# Patient Record
Sex: FEMALE | Race: WHITE | NOT HISPANIC OR LATINO | URBAN - METROPOLITAN AREA
[De-identification: names, ages, dates, MRNs, and addresses within clinical notes are randomized per-mention and may not be internally consistent; named-entity substitution may affect disease eponyms.]

---

## 2018-05-28 ENCOUNTER — EMERGENCY (EMERGENCY)
Facility: HOSPITAL | Age: 68
LOS: 1 days | Discharge: HOME | End: 2018-05-28
Attending: EMERGENCY MEDICINE | Admitting: EMERGENCY MEDICINE
Payer: COMMERCIAL

## 2018-05-28 VITALS
RESPIRATION RATE: 18 BRPM | OXYGEN SATURATION: 100 % | TEMPERATURE: 98 F | HEART RATE: 70 BPM | SYSTOLIC BLOOD PRESSURE: 108 MMHG | DIASTOLIC BLOOD PRESSURE: 80 MMHG

## 2018-05-28 VITALS
HEART RATE: 88 BPM | TEMPERATURE: 99 F | RESPIRATION RATE: 17 BRPM | SYSTOLIC BLOOD PRESSURE: 142 MMHG | OXYGEN SATURATION: 98 % | DIASTOLIC BLOOD PRESSURE: 67 MMHG

## 2018-05-28 DIAGNOSIS — Z41.1 ENCOUNTER FOR COSMETIC SURGERY: Chronic | ICD-10-CM

## 2018-05-28 DIAGNOSIS — Z79.899 OTHER LONG TERM (CURRENT) DRUG THERAPY: ICD-10-CM

## 2018-05-28 DIAGNOSIS — Z98.89 OTHER SPECIFIED POSTPROCEDURAL STATES: Chronic | ICD-10-CM

## 2018-05-28 DIAGNOSIS — K52.9 NONINFECTIVE GASTROENTERITIS AND COLITIS, UNSPECIFIED: ICD-10-CM

## 2018-05-28 DIAGNOSIS — R10.84 GENERALIZED ABDOMINAL PAIN: ICD-10-CM

## 2018-05-28 DIAGNOSIS — Z79.82 LONG TERM (CURRENT) USE OF ASPIRIN: ICD-10-CM

## 2018-05-28 DIAGNOSIS — E78.5 HYPERLIPIDEMIA, UNSPECIFIED: ICD-10-CM

## 2018-05-28 LAB
ALBUMIN SERPL ELPH-MCNC: 4.1 G/DL — SIGNIFICANT CHANGE UP (ref 3.4–5)
ALP SERPL-CCNC: 96 U/L — SIGNIFICANT CHANGE UP (ref 40–120)
ALT FLD-CCNC: 32 U/L — SIGNIFICANT CHANGE UP (ref 12–42)
ANION GAP SERPL CALC-SCNC: 9 MMOL/L — SIGNIFICANT CHANGE UP (ref 9–16)
APPEARANCE UR: CLEAR — SIGNIFICANT CHANGE UP
AST SERPL-CCNC: 25 U/L — SIGNIFICANT CHANGE UP (ref 15–37)
BASOPHILS NFR BLD AUTO: 0.2 % — SIGNIFICANT CHANGE UP (ref 0–2)
BILIRUB SERPL-MCNC: 0.4 MG/DL — SIGNIFICANT CHANGE UP (ref 0.2–1.2)
BILIRUB UR-MCNC: NEGATIVE — SIGNIFICANT CHANGE UP
BUN SERPL-MCNC: 21 MG/DL — SIGNIFICANT CHANGE UP (ref 7–23)
CALCIUM SERPL-MCNC: 9.9 MG/DL — SIGNIFICANT CHANGE UP (ref 8.5–10.5)
CHLORIDE SERPL-SCNC: 104 MMOL/L — SIGNIFICANT CHANGE UP (ref 96–108)
CO2 SERPL-SCNC: 27 MMOL/L — SIGNIFICANT CHANGE UP (ref 22–31)
COLOR SPEC: YELLOW — SIGNIFICANT CHANGE UP
CREAT SERPL-MCNC: 1.01 MG/DL — SIGNIFICANT CHANGE UP (ref 0.5–1.3)
DIFF PNL FLD: (no result)
EOSINOPHIL NFR BLD AUTO: 0 % — SIGNIFICANT CHANGE UP (ref 0–6)
GLUCOSE SERPL-MCNC: 121 MG/DL — HIGH (ref 70–99)
GLUCOSE UR QL: NEGATIVE — SIGNIFICANT CHANGE UP
HCT VFR BLD CALC: 44.3 % — SIGNIFICANT CHANGE UP (ref 34.5–45)
HGB BLD-MCNC: 15.3 G/DL — SIGNIFICANT CHANGE UP (ref 11.5–15.5)
IMM GRANULOCYTES NFR BLD AUTO: 0.3 % — SIGNIFICANT CHANGE UP (ref 0–1.5)
KETONES UR-MCNC: NEGATIVE — SIGNIFICANT CHANGE UP
LEUKOCYTE ESTERASE UR-ACNC: NEGATIVE — SIGNIFICANT CHANGE UP
LYMPHOCYTES # BLD AUTO: 3.8 % — LOW (ref 13–44)
MAGNESIUM SERPL-MCNC: 1.9 MG/DL — SIGNIFICANT CHANGE UP (ref 1.6–2.6)
MCHC RBC-ENTMCNC: 31.9 PG — SIGNIFICANT CHANGE UP (ref 27–34)
MCHC RBC-ENTMCNC: 34.5 G/DL — SIGNIFICANT CHANGE UP (ref 32–36)
MCV RBC AUTO: 92.3 FL — SIGNIFICANT CHANGE UP (ref 80–100)
MONOCYTES NFR BLD AUTO: 2.5 % — SIGNIFICANT CHANGE UP (ref 2–14)
NEUTROPHILS NFR BLD AUTO: 93.2 % — HIGH (ref 43–77)
NITRITE UR-MCNC: NEGATIVE — SIGNIFICANT CHANGE UP
PH UR: 6 — SIGNIFICANT CHANGE UP (ref 5–8)
PLATELET # BLD AUTO: 219 K/UL — SIGNIFICANT CHANGE UP (ref 150–400)
POTASSIUM SERPL-MCNC: 4.1 MMOL/L — SIGNIFICANT CHANGE UP (ref 3.5–5.3)
POTASSIUM SERPL-SCNC: 4.1 MMOL/L — SIGNIFICANT CHANGE UP (ref 3.5–5.3)
PROT SERPL-MCNC: 7.6 G/DL — SIGNIFICANT CHANGE UP (ref 6.4–8.2)
PROT UR-MCNC: NEGATIVE MG/DL — SIGNIFICANT CHANGE UP
RBC # BLD: 4.8 M/UL — SIGNIFICANT CHANGE UP (ref 3.8–5.2)
RBC # FLD: 11.9 % — SIGNIFICANT CHANGE UP (ref 10.3–16.9)
SODIUM SERPL-SCNC: 140 MMOL/L — SIGNIFICANT CHANGE UP (ref 132–145)
SP GR SPEC: 1.01 — SIGNIFICANT CHANGE UP (ref 1–1.03)
UROBILINOGEN FLD QL: 0.2 E.U./DL — SIGNIFICANT CHANGE UP
WBC # BLD: 12.4 K/UL — HIGH (ref 3.8–10.5)
WBC # FLD AUTO: 12.4 K/UL — HIGH (ref 3.8–10.5)

## 2018-05-28 PROCEDURE — 99284 EMERGENCY DEPT VISIT MOD MDM: CPT

## 2018-05-28 PROCEDURE — 74177 CT ABD & PELVIS W/CONTRAST: CPT | Mod: 26

## 2018-05-28 RX ORDER — FAMOTIDINE 10 MG/ML
20 INJECTION INTRAVENOUS ONCE
Qty: 0 | Refills: 0 | Status: COMPLETED | OUTPATIENT
Start: 2018-05-28 | End: 2018-05-28

## 2018-05-28 RX ORDER — CIPROFLOXACIN LACTATE 400MG/40ML
500 VIAL (ML) INTRAVENOUS ONCE
Qty: 0 | Refills: 0 | Status: COMPLETED | OUTPATIENT
Start: 2018-05-28 | End: 2018-05-28

## 2018-05-28 RX ORDER — SODIUM CHLORIDE 9 MG/ML
1000 INJECTION INTRAMUSCULAR; INTRAVENOUS; SUBCUTANEOUS ONCE
Qty: 0 | Refills: 0 | Status: COMPLETED | OUTPATIENT
Start: 2018-05-28 | End: 2018-05-28

## 2018-05-28 RX ORDER — ONDANSETRON 8 MG/1
4 TABLET, FILM COATED ORAL ONCE
Qty: 0 | Refills: 0 | Status: COMPLETED | OUTPATIENT
Start: 2018-05-28 | End: 2018-05-28

## 2018-05-28 RX ORDER — METRONIDAZOLE 500 MG
1 TABLET ORAL
Qty: 20 | Refills: 0 | OUTPATIENT
Start: 2018-05-28 | End: 2018-06-06

## 2018-05-28 RX ORDER — MOXIFLOXACIN HYDROCHLORIDE TABLETS, 400 MG 400 MG/1
1 TABLET, FILM COATED ORAL
Qty: 20 | Refills: 0 | OUTPATIENT
Start: 2018-05-28 | End: 2018-06-06

## 2018-05-28 RX ORDER — SODIUM CHLORIDE 9 MG/ML
1000 INJECTION INTRAMUSCULAR; INTRAVENOUS; SUBCUTANEOUS ONCE
Qty: 0 | Refills: 0 | Status: DISCONTINUED | OUTPATIENT
Start: 2018-05-28 | End: 2018-05-28

## 2018-05-28 RX ORDER — IOHEXOL 300 MG/ML
50 INJECTION, SOLUTION INTRAVENOUS ONCE
Qty: 0 | Refills: 0 | Status: COMPLETED | OUTPATIENT
Start: 2018-05-28 | End: 2018-05-28

## 2018-05-28 RX ORDER — METRONIDAZOLE 500 MG
500 TABLET ORAL ONCE
Qty: 0 | Refills: 0 | Status: COMPLETED | OUTPATIENT
Start: 2018-05-28 | End: 2018-05-28

## 2018-05-28 RX ORDER — KETOROLAC TROMETHAMINE 30 MG/ML
30 SYRINGE (ML) INJECTION ONCE
Qty: 0 | Refills: 0 | Status: DISCONTINUED | OUTPATIENT
Start: 2018-05-28 | End: 2018-05-28

## 2018-05-28 RX ORDER — ACETAMINOPHEN 500 MG
650 TABLET ORAL ONCE
Qty: 0 | Refills: 0 | Status: COMPLETED | OUTPATIENT
Start: 2018-05-28 | End: 2018-05-28

## 2018-05-28 RX ADMIN — Medication 500 MILLIGRAM(S): at 18:52

## 2018-05-28 RX ADMIN — Medication 650 MILLIGRAM(S): at 15:34

## 2018-05-28 RX ADMIN — FAMOTIDINE 100 MILLIGRAM(S): 10 INJECTION INTRAVENOUS at 13:58

## 2018-05-28 RX ADMIN — Medication 30 MILLIGRAM(S): at 14:15

## 2018-05-28 RX ADMIN — IOHEXOL 50 MILLILITER(S): 300 INJECTION, SOLUTION INTRAVENOUS at 14:11

## 2018-05-28 RX ADMIN — Medication 500 MILLIGRAM(S): at 18:51

## 2018-05-28 RX ADMIN — SODIUM CHLORIDE 2000 MILLILITER(S): 9 INJECTION INTRAMUSCULAR; INTRAVENOUS; SUBCUTANEOUS at 13:59

## 2018-05-28 RX ADMIN — ONDANSETRON 4 MILLIGRAM(S): 8 TABLET, FILM COATED ORAL at 13:58

## 2018-05-28 NOTE — ED PROVIDER NOTE - MEDICAL DECISION MAKING DETAILS
nausea, abdominal pain, diarrhea; diverticulitis, vs, colitis; low suspicion for appendicitis.  will obtain labs and ct abdomen.

## 2018-05-28 NOTE — ED ADULT TRIAGE NOTE - CHIEF COMPLAINT QUOTE
patient comes in for abdominal pain which she feels like its due to food poisoning Has a history of diverticulosis and states has a lot of popcorn yesterday.

## 2018-05-28 NOTE — ED PROVIDER NOTE - OBJECTIVE STATEMENT
Pt is 69 yo female with pmhx of IBS, anxiety presents with nausea and abdominal pain with multiple episodes of diarrhea today that began overnight.  Pt reports also hx of diverticulosis.  She states she ate " a lot of popcorn" last night.  Pt denies any chills, fevers.  Pt denies any vomiting, rash, recent travel, melena, or hematochezia.

## 2018-06-09 ENCOUNTER — EMERGENCY (EMERGENCY)
Facility: HOSPITAL | Age: 68
LOS: 1 days | Discharge: HOME | End: 2018-06-09
Attending: EMERGENCY MEDICINE | Admitting: EMERGENCY MEDICINE
Payer: COMMERCIAL

## 2018-06-09 VITALS
SYSTOLIC BLOOD PRESSURE: 137 MMHG | HEART RATE: 91 BPM | WEIGHT: 125 LBS | DIASTOLIC BLOOD PRESSURE: 84 MMHG | RESPIRATION RATE: 20 BRPM | TEMPERATURE: 98 F | OXYGEN SATURATION: 98 %

## 2018-06-09 DIAGNOSIS — Z98.89 OTHER SPECIFIED POSTPROCEDURAL STATES: Chronic | ICD-10-CM

## 2018-06-09 DIAGNOSIS — Z41.1 ENCOUNTER FOR COSMETIC SURGERY: Chronic | ICD-10-CM

## 2018-06-09 PROCEDURE — 99284 EMERGENCY DEPT VISIT MOD MDM: CPT | Mod: 25

## 2018-06-09 PROCEDURE — 93010 ELECTROCARDIOGRAM REPORT: CPT

## 2018-06-09 RX ORDER — SUCRALFATE 1 G
1 TABLET ORAL ONCE
Qty: 0 | Refills: 0 | Status: COMPLETED | OUTPATIENT
Start: 2018-06-09 | End: 2018-06-09

## 2018-06-09 RX ORDER — METOCLOPRAMIDE HCL 10 MG
5 TABLET ORAL ONCE
Qty: 0 | Refills: 0 | Status: COMPLETED | OUTPATIENT
Start: 2018-06-09 | End: 2018-06-09

## 2018-06-09 RX ORDER — FAMOTIDINE 10 MG/ML
20 INJECTION INTRAVENOUS ONCE
Qty: 0 | Refills: 0 | Status: COMPLETED | OUTPATIENT
Start: 2018-06-09 | End: 2018-06-09

## 2018-06-09 RX ORDER — SODIUM CHLORIDE 9 MG/ML
1000 INJECTION INTRAMUSCULAR; INTRAVENOUS; SUBCUTANEOUS ONCE
Qty: 0 | Refills: 0 | Status: COMPLETED | OUTPATIENT
Start: 2018-06-09 | End: 2018-06-09

## 2018-06-09 NOTE — ED PROVIDER NOTE - OBJECTIVE STATEMENT
69 yo female pmhx diverticulosis/colitis/IBS to ED c/o intermittent sharp colicky diffuse abd pain and bloating. Pt states she feels "gassy". denies cp/soa/syncope/palpitations/urinary frequency/dysuria/f/c. No international travel within past 30 days. Denies diarrhea/constipation/melena.

## 2018-06-09 NOTE — ED ADULT TRIAGE NOTE - CHIEF COMPLAINT QUOTE
Pt ambulatory, recently treated for colitis last May 28, complaining of abdominal pain and 'feeling of fullness", with diarrhea today. Also complains of nausea and lightheadedness; no vomiting.

## 2018-06-10 VITALS
OXYGEN SATURATION: 98 % | TEMPERATURE: 98 F | RESPIRATION RATE: 16 BRPM | SYSTOLIC BLOOD PRESSURE: 124 MMHG | DIASTOLIC BLOOD PRESSURE: 74 MMHG | HEART RATE: 85 BPM

## 2018-06-10 LAB
ALBUMIN SERPL ELPH-MCNC: 3.9 G/DL — SIGNIFICANT CHANGE UP (ref 3.4–5)
ALP SERPL-CCNC: 93 U/L — SIGNIFICANT CHANGE UP (ref 40–120)
ALT FLD-CCNC: 30 U/L — SIGNIFICANT CHANGE UP (ref 12–42)
AMYLASE P1 CFR SERPL: 96 U/L — SIGNIFICANT CHANGE UP (ref 25–115)
ANION GAP SERPL CALC-SCNC: 10 MMOL/L — SIGNIFICANT CHANGE UP (ref 9–16)
APPEARANCE UR: CLEAR — SIGNIFICANT CHANGE UP
AST SERPL-CCNC: 29 U/L — SIGNIFICANT CHANGE UP (ref 15–37)
BILIRUB SERPL-MCNC: 0.3 MG/DL — SIGNIFICANT CHANGE UP (ref 0.2–1.2)
BILIRUB UR-MCNC: NEGATIVE — SIGNIFICANT CHANGE UP
BUN SERPL-MCNC: 18 MG/DL — SIGNIFICANT CHANGE UP (ref 7–23)
CALCIUM SERPL-MCNC: 9.7 MG/DL — SIGNIFICANT CHANGE UP (ref 8.5–10.5)
CHLORIDE SERPL-SCNC: 107 MMOL/L — SIGNIFICANT CHANGE UP (ref 96–108)
CO2 SERPL-SCNC: 24 MMOL/L — SIGNIFICANT CHANGE UP (ref 22–31)
COLOR SPEC: YELLOW — SIGNIFICANT CHANGE UP
CREAT SERPL-MCNC: 0.91 MG/DL — SIGNIFICANT CHANGE UP (ref 0.5–1.3)
DIFF PNL FLD: NEGATIVE — SIGNIFICANT CHANGE UP
GLUCOSE SERPL-MCNC: 115 MG/DL — HIGH (ref 70–99)
GLUCOSE UR QL: NEGATIVE — SIGNIFICANT CHANGE UP
HCT VFR BLD CALC: 41.7 % — SIGNIFICANT CHANGE UP (ref 34.5–45)
HGB BLD-MCNC: 14.2 G/DL — SIGNIFICANT CHANGE UP (ref 11.5–15.5)
KETONES UR-MCNC: NEGATIVE — SIGNIFICANT CHANGE UP
LACTATE SERPL-SCNC: 1.7 MMOL/L — SIGNIFICANT CHANGE UP (ref 0.4–2)
LEUKOCYTE ESTERASE UR-ACNC: NEGATIVE — SIGNIFICANT CHANGE UP
LIDOCAIN IGE QN: 246 U/L — SIGNIFICANT CHANGE UP (ref 73–393)
MCHC RBC-ENTMCNC: 31.3 PG — SIGNIFICANT CHANGE UP (ref 27–34)
MCHC RBC-ENTMCNC: 34.1 G/DL — SIGNIFICANT CHANGE UP (ref 32–36)
MCV RBC AUTO: 92.1 FL — SIGNIFICANT CHANGE UP (ref 80–100)
NITRITE UR-MCNC: NEGATIVE — SIGNIFICANT CHANGE UP
PH UR: 6 — SIGNIFICANT CHANGE UP (ref 5–8)
PLATELET # BLD AUTO: 212 K/UL — SIGNIFICANT CHANGE UP (ref 150–400)
POTASSIUM SERPL-MCNC: 4.2 MMOL/L — SIGNIFICANT CHANGE UP (ref 3.5–5.3)
POTASSIUM SERPL-SCNC: 4.2 MMOL/L — SIGNIFICANT CHANGE UP (ref 3.5–5.3)
PROT SERPL-MCNC: 7.1 G/DL — SIGNIFICANT CHANGE UP (ref 6.4–8.2)
PROT UR-MCNC: NEGATIVE MG/DL — SIGNIFICANT CHANGE UP
RBC # BLD: 4.53 M/UL — SIGNIFICANT CHANGE UP (ref 3.8–5.2)
RBC # FLD: 12 % — SIGNIFICANT CHANGE UP (ref 10.3–16.9)
SODIUM SERPL-SCNC: 141 MMOL/L — SIGNIFICANT CHANGE UP (ref 132–145)
SP GR SPEC: 1.01 — SIGNIFICANT CHANGE UP (ref 1–1.03)
TROPONIN I SERPL-MCNC: <0.017 NG/ML — LOW (ref 0.02–0.06)
TSH SERPL-MCNC: 1.2 UIU/ML — SIGNIFICANT CHANGE UP (ref 0.36–3.74)
UROBILINOGEN FLD QL: 0.2 E.U./DL — SIGNIFICANT CHANGE UP
WBC # BLD: 12.2 K/UL — HIGH (ref 3.8–10.5)
WBC # FLD AUTO: 12.2 K/UL — HIGH (ref 3.8–10.5)

## 2018-06-10 PROCEDURE — 74176 CT ABD & PELVIS W/O CONTRAST: CPT | Mod: 26

## 2018-06-10 RX ORDER — SUCRALFATE 1 G
1 TABLET ORAL
Qty: 120 | Refills: 0 | OUTPATIENT
Start: 2018-06-10 | End: 2018-07-09

## 2018-06-10 RX ORDER — LOPERAMIDE HCL/SIMETHICONE 2-125MG
1 TABLET,CHEWABLE ORAL
Qty: 30 | Refills: 0 | OUTPATIENT
Start: 2018-06-10

## 2018-06-10 RX ADMIN — Medication 1 GRAM(S): at 00:08

## 2018-06-10 RX ADMIN — Medication 5 MILLIGRAM(S): at 00:14

## 2018-06-10 RX ADMIN — FAMOTIDINE 20 MILLIGRAM(S): 10 INJECTION INTRAVENOUS at 00:08

## 2018-06-10 RX ADMIN — SODIUM CHLORIDE 1000 MILLILITER(S): 9 INJECTION INTRAMUSCULAR; INTRAVENOUS; SUBCUTANEOUS at 00:13

## 2018-06-13 DIAGNOSIS — K20.9 ESOPHAGITIS, UNSPECIFIED: ICD-10-CM

## 2018-06-13 DIAGNOSIS — Z79.01 LONG TERM (CURRENT) USE OF ANTICOAGULANTS: ICD-10-CM

## 2018-06-13 DIAGNOSIS — R10.84 GENERALIZED ABDOMINAL PAIN: ICD-10-CM

## 2018-06-13 DIAGNOSIS — Z79.899 OTHER LONG TERM (CURRENT) DRUG THERAPY: ICD-10-CM

## 2018-06-13 DIAGNOSIS — E78.5 HYPERLIPIDEMIA, UNSPECIFIED: ICD-10-CM

## 2018-06-13 DIAGNOSIS — Z79.2 LONG TERM (CURRENT) USE OF ANTIBIOTICS: ICD-10-CM

## 2019-10-10 ENCOUNTER — APPOINTMENT (OUTPATIENT)
Dept: OTOLARYNGOLOGY | Facility: CLINIC | Age: 69
End: 2019-10-10
Payer: COMMERCIAL

## 2019-10-10 VITALS
HEIGHT: 62 IN | BODY MASS INDEX: 22.82 KG/M2 | WEIGHT: 124 LBS | HEART RATE: 66 BPM | SYSTOLIC BLOOD PRESSURE: 130 MMHG | DIASTOLIC BLOOD PRESSURE: 70 MMHG

## 2019-10-10 DIAGNOSIS — Z87.09 PERSONAL HISTORY OF OTHER DISEASES OF THE RESPIRATORY SYSTEM: ICD-10-CM

## 2019-10-10 DIAGNOSIS — H92.09 OTALGIA, UNSPECIFIED EAR: ICD-10-CM

## 2019-10-10 PROCEDURE — 99213 OFFICE O/P EST LOW 20 MIN: CPT | Mod: 25

## 2019-10-10 PROCEDURE — 69210 REMOVE IMPACTED EAR WAX UNI: CPT

## 2019-10-10 NOTE — REVIEW OF SYSTEMS
[Seasonal Allergies] : seasonal allergies [Post Nasal Drip] : post nasal drip [Ear Pain] : ear pain [Vertigo] : vertigo [Ear Noises] : ear noises [Nasal Congestion] : nasal congestion [Hoarseness] : hoarseness [Dry Eyes] : dry eyes [Heartburn] : heartburn [Patient Intake Form Reviewed] : Patient intake form was reviewed

## 2019-10-10 NOTE — HISTORY OF PRESENT ILLNESS
[de-identified] : HEATH ARREOLA is a 69 year woman with a history of TMJD. She has had recent right sided intermittent pain and a sound when she open her mouth.

## 2019-10-10 NOTE — ASSESSMENT
[FreeTextEntry1] : HEATH ARREOLA feels better after ear cleaning. She has cervical spasm and will use heat and massage.

## 2019-10-12 ENCOUNTER — TRANSCRIPTION ENCOUNTER (OUTPATIENT)
Age: 69
End: 2019-10-12

## 2020-01-09 ENCOUNTER — APPOINTMENT (OUTPATIENT)
Dept: OTOLARYNGOLOGY | Facility: CLINIC | Age: 70
End: 2020-01-09
Payer: COMMERCIAL

## 2020-01-09 VITALS
SYSTOLIC BLOOD PRESSURE: 119 MMHG | HEIGHT: 62 IN | BODY MASS INDEX: 22.82 KG/M2 | WEIGHT: 124 LBS | DIASTOLIC BLOOD PRESSURE: 63 MMHG | HEART RATE: 86 BPM

## 2020-01-09 DIAGNOSIS — H81.13 BENIGN PAROXYSMAL VERTIGO, BILATERAL: ICD-10-CM

## 2020-01-09 PROCEDURE — 92567 TYMPANOMETRY: CPT

## 2020-01-09 PROCEDURE — 99213 OFFICE O/P EST LOW 20 MIN: CPT | Mod: 25

## 2020-01-09 PROCEDURE — 31231 NASAL ENDOSCOPY DX: CPT

## 2020-01-09 PROCEDURE — 92557 COMPREHENSIVE HEARING TEST: CPT

## 2020-01-09 RX ORDER — AZELASTINE HYDROCHLORIDE AND FLUTICASONE PROPIONATE 137; 50 UG/1; UG/1
137-50 SPRAY, METERED NASAL
Qty: 1 | Refills: 3 | Status: DISCONTINUED | COMMUNITY
Start: 2019-10-10 | End: 2020-01-09

## 2020-01-09 RX ORDER — AZELASTINE HYDROCHLORIDE AND FLUTICASONE PROPIONATE 137; 50 UG/1; UG/1
137-50 SPRAY, METERED NASAL
Qty: 3 | Refills: 5 | Status: ACTIVE | COMMUNITY
Start: 2020-01-09 | End: 1900-01-01

## 2020-01-09 NOTE — ASSESSMENT
[FreeTextEntry1] : HEATH ARREOLA appears to have resolving BPPV. She will do the vestibular exercises. It is possible her dizziness has to do with her neck. She will use Dymista.

## 2020-01-09 NOTE — CONSULT LETTER
[Dear  ___] : Dear  [unfilled], [Consult Letter:] : I had the pleasure of evaluating your patient, [unfilled]. [Please see my note below.] : Please see my note below. [Sincerely,] : Sincerely, [FreeTextEntry3] : Gilberto Renee MD\par

## 2020-01-09 NOTE — REVIEW OF SYSTEMS
[Seasonal Allergies] : seasonal allergies [Dizziness] : dizziness [Vertigo] : vertigo [Nasal Congestion] : nasal congestion [Throat Clearing] : throat clearing [Patient Intake Form Reviewed] : Patient intake form was reviewed

## 2020-01-09 NOTE — HISTORY OF PRESENT ILLNESS
[de-identified] : HEATH ARREOLA is a 69 year woman with a history of about 3 weeks of intermittent positional dizziness. She has been having some car sickness. She recently used steroids for the dizziness. She is getting PT for a problem with her neck.\par She also is having nasal congestion.

## 2020-09-22 ENCOUNTER — APPOINTMENT (OUTPATIENT)
Dept: OTOLARYNGOLOGY | Facility: CLINIC | Age: 70
End: 2020-09-22
Payer: COMMERCIAL

## 2020-09-22 VITALS — BODY MASS INDEX: 22.82 KG/M2 | WEIGHT: 124 LBS | HEIGHT: 62 IN | TEMPERATURE: 98.6 F

## 2020-09-22 DIAGNOSIS — R51 HEADACHE: ICD-10-CM

## 2020-09-22 PROCEDURE — 99213 OFFICE O/P EST LOW 20 MIN: CPT | Mod: 25

## 2020-09-22 PROCEDURE — 31575 DIAGNOSTIC LARYNGOSCOPY: CPT

## 2020-09-23 NOTE — HISTORY OF PRESENT ILLNESS
[de-identified] : HEATH ARREOLA is a 70 year woman with a history of cervical spine disease. She has tingling in her hands. She is seeing Dr. Camden Armando.MRI showed probable valleculae retention cyst.

## 2020-09-23 NOTE — CONSULT LETTER
[Consult Letter:] : I had the pleasure of evaluating your patient, [unfilled]. [Please see my note below.] : Please see my note below. [Consult Closing:] : Thank you very much for allowing me to participate in the care of this patient.  If you have any questions, please do not hesitate to contact me. [Sincerely,] : Sincerely, [Dear  ___] : Dear ~AYE, [FreeTextEntry3] : Gilberto Renee MD

## 2021-03-24 ENCOUNTER — NON-APPOINTMENT (OUTPATIENT)
Age: 71
End: 2021-03-24

## 2021-03-26 ENCOUNTER — APPOINTMENT (OUTPATIENT)
Dept: OTOLARYNGOLOGY | Facility: CLINIC | Age: 71
End: 2021-03-26
Payer: COMMERCIAL

## 2021-03-26 VITALS — HEIGHT: 62 IN | BODY MASS INDEX: 22.82 KG/M2 | WEIGHT: 124 LBS | TEMPERATURE: 97.5 F

## 2021-03-26 DIAGNOSIS — H91.13 PRESBYCUSIS, BILATERAL: ICD-10-CM

## 2021-03-26 DIAGNOSIS — H61.23 IMPACTED CERUMEN, BILATERAL: ICD-10-CM

## 2021-03-26 PROCEDURE — 99214 OFFICE O/P EST MOD 30 MIN: CPT | Mod: 25

## 2021-03-26 PROCEDURE — G0268 REMOVAL OF IMPACTED WAX MD: CPT

## 2021-03-26 PROCEDURE — 92557 COMPREHENSIVE HEARING TEST: CPT

## 2021-03-26 PROCEDURE — 99072 ADDL SUPL MATRL&STAF TM PHE: CPT

## 2021-03-26 PROCEDURE — 92567 TYMPANOMETRY: CPT

## 2021-03-26 NOTE — ASSESSMENT
[FreeTextEntry1] : HEATH ARREOLA probably has resolving right BPPV. Cervicogenic dizziness is another possibility. I suggested watchful waiting. I will speak to her in several days. If she isn't better I will refer her for Vestibular therapy.\par She will see neurologist regarding hr neck.

## 2021-03-26 NOTE — REVIEW OF SYSTEMS
[Vertigo] : vertigo [Negative] : Heme/Lymph [Patient Intake Form Reviewed] : Patient intake form was reviewed

## 2021-03-26 NOTE — HISTORY OF PRESENT ILLNESS
[de-identified] : HEATH ARREOLA is here complaining of dizziness. When getting out of bed 4 days ago she became very dizzy and off balance. She found head position worsened it. She denied aural symptoms. Her gait is off. She is feeling a bit better today. She has been having neck pain and numbness. She had problem noted on MRI.

## 2021-03-26 NOTE — CONSULT LETTER
[Dear  ___] : Dear  [unfilled], [Consult Letter:] : I had the pleasure of evaluating your patient, [unfilled]. [Please see my note below.] : Please see my note below. [Consult Closing:] : Thank you very much for allowing me to participate in the care of this patient.  If you have any questions, please do not hesitate to contact me. [FreeTextEntry3] : Gilberto Renee MD\par

## 2021-10-12 ENCOUNTER — APPOINTMENT (OUTPATIENT)
Dept: OTOLARYNGOLOGY | Facility: CLINIC | Age: 71
End: 2021-10-12
Payer: MEDICARE

## 2021-10-12 DIAGNOSIS — H91.93 UNSPECIFIED HEARING LOSS, BILATERAL: ICD-10-CM

## 2021-10-12 PROCEDURE — 31575 DIAGNOSTIC LARYNGOSCOPY: CPT

## 2021-10-12 PROCEDURE — 92567 TYMPANOMETRY: CPT

## 2021-10-12 PROCEDURE — 92557 COMPREHENSIVE HEARING TEST: CPT

## 2021-10-12 PROCEDURE — 99213 OFFICE O/P EST LOW 20 MIN: CPT | Mod: 25

## 2021-10-12 PROCEDURE — 69210 REMOVE IMPACTED EAR WAX UNI: CPT

## 2021-10-12 NOTE — HISTORY OF PRESENT ILLNESS
[de-identified] : HEATH ARREOLA is a 71 year woman with a history of 2 weeks of right ear pain and throat. The ear pain is sharp and severe. She is having increased  tinnitus AD which has gone back to previous levels.

## 2021-10-12 NOTE — ASSESSMENT
[FreeTextEntry1] : HEATH ARREOLA appears to be having neck and TMJ pain.  Her audiogram today is unchanged. I think her transient increase in tinnitus is due to her TMJ dysfunction and muscular spasm.I will speak with Dr. Ennis. I think she would benefit from Physical therapy.

## 2021-10-12 NOTE — REVIEW OF SYSTEMS
[Ear Pain] : ear pain [Negative] : Heme/Lymph [Patient Intake Form Reviewed] : Patient intake form was reviewed [de-identified] : right ear pain

## 2021-10-13 PROBLEM — H91.93 HIGH FREQUENCY HEARING LOSS OF BOTH EARS: Status: ACTIVE | Noted: 2020-02-24

## 2021-10-25 ENCOUNTER — APPOINTMENT (OUTPATIENT)
Dept: PHYSICAL MEDICINE AND REHAB | Facility: CLINIC | Age: 71
End: 2021-10-25
Payer: MEDICARE

## 2021-10-25 VITALS — WEIGHT: 124 LBS | BODY MASS INDEX: 22.82 KG/M2 | HEIGHT: 62 IN | RESPIRATION RATE: 16 BRPM

## 2021-10-25 DIAGNOSIS — M17.0 BILATERAL PRIMARY OSTEOARTHRITIS OF KNEE: ICD-10-CM

## 2021-10-25 DIAGNOSIS — Z86.79 PERSONAL HISTORY OF OTHER DISEASES OF THE CIRCULATORY SYSTEM: ICD-10-CM

## 2021-10-25 DIAGNOSIS — M54.12 RADICULOPATHY, CERVICAL REGION: ICD-10-CM

## 2021-10-25 PROCEDURE — 99204 OFFICE O/P NEW MOD 45 MIN: CPT

## 2021-10-25 NOTE — PHYSICAL EXAM
[FreeTextEntry1] : CERVICAL\par GEN: AAOx3. NAD.\par CERVICAL ROM: Flexion, extension, side-bending, rotation, oblique extension all full/pain free.  \par SHOULDER ROM: Full and pain free B/L.\par PALP: (+) TTP B-Traps/LevScap/Paraspinals. No TTP midline or shoulder region B/L.\par INSP: Spine alignment is midline, No evidence of scoliosis.\par STRENGTH: 5/5 bilateral shoulder abductors, elbow flexors, elbow extensors, wrist extensors, hand intrinsics, long finger flexors to D3 and D5.\par TONE: Normal, No clonus.\par REFLEXES: Symmetric biceps, triceps, brachioradialis, pronator teres. Trejo's (-) B/L.\par SENSATION: Grossly intact LT BUE.\par GAIT: Non antalgic, Normal reciprocating heel to toe.\par SPECIAL: Spurling's (-) B/L. Axial Compression (-).

## 2021-10-25 NOTE — HISTORY OF PRESENT ILLNESS
[FreeTextEntry1] : Referring Physician: Dr. Gilberto Renee MD\par \par Ms. HEATH ARREOLA is a very pleasant 71 year RHD female who comes in for evaluation of Neck Pain that has been ongoing for years without any specific injury or inciting event. Patient has tried Chiropractor, Injection in her Neck approximately 1-2 years ago, Tylenol, PT which helped temporarily. The pain is located primarily on her Neck radiating to BILATERAL ARMS intermittent and described as tightness and sharp with numbness/tingling. The pain is rated as 4/10 and ranges from 4-7/10. The patient's symptoms are aggravated by sitting, playing Guitar and alleviated by PT. 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 The numbness sometimes lasts for an hour and then resolves. She denies any night pain, weakness, or bowel/bladder dysfunction.\par

## 2021-10-25 NOTE — DATA REVIEWED
[MRI] : MRI [FreeTextEntry1] : MRI CSPINE 09/2020: C5-6 disc bulge with bilateral foraminal narrowing.

## 2021-10-25 NOTE — ASSESSMENT
[FreeTextEntry1] : Impression:\par 1. Cervical Radiculopathy vs Spinal Stenosis\par 2. Cervicalgia/Myofascial pain\par 3. Bilateral Knee DJD\par \par Plan: The history and physical examination were reviewed. Symptoms are potentially CSpine in etiology however the history/physical are atypical. The patient have a predominant muscular/myofascial component. The diagnosis was discussed with the patient along with treatment options including physical therapy, oral medication, interventional spine procedures, and surgery; as well as alternative therapeutics such as acupuncture and massage. We also discussed the importance of activity modification, ergonomics and posture in the long term management of the condition. At this time I am recommending that the patient undergo an MRI of the CSpine to further evaluate for disc pathology and nerve root involvement in preparation for MICHEL. We will also start a course of acupuncture for her neck. She also has DJD of the knees that has not been managed in several years, we will obtain new XR and initial care. The patient was educated on red flag symptoms and was instructed to call the office should the current condition worsen or new symptoms develop. The patient will follow up for imaging review. The patient expressed verbal understanding and is in agreement with the plan of care. All of the patient's questions and concerns were addressed during today's visit.\par

## 2021-11-05 ENCOUNTER — APPOINTMENT (OUTPATIENT)
Dept: OTOLARYNGOLOGY | Facility: CLINIC | Age: 71
End: 2021-11-05
Payer: MEDICARE

## 2021-11-05 ENCOUNTER — APPOINTMENT (OUTPATIENT)
Dept: PHYSICAL MEDICINE AND REHAB | Facility: CLINIC | Age: 71
End: 2021-11-05
Payer: MEDICARE

## 2021-11-05 VITALS — BODY MASS INDEX: 22.82 KG/M2 | WEIGHT: 124 LBS | HEIGHT: 62 IN | RESPIRATION RATE: 16 BRPM

## 2021-11-05 VITALS — WEIGHT: 124 LBS | TEMPERATURE: 97 F | HEIGHT: 62 IN | BODY MASS INDEX: 22.82 KG/M2

## 2021-11-05 DIAGNOSIS — G44.209 TENSION-TYPE HEADACHE, UNSPECIFIED, NOT INTRACTABLE: ICD-10-CM

## 2021-11-05 DIAGNOSIS — H92.01 OTALGIA, RIGHT EAR: ICD-10-CM

## 2021-11-05 DIAGNOSIS — H93.11 TINNITUS, RIGHT EAR: ICD-10-CM

## 2021-11-05 PROCEDURE — 99213 OFFICE O/P EST LOW 20 MIN: CPT

## 2021-11-05 PROCEDURE — 99214 OFFICE O/P EST MOD 30 MIN: CPT

## 2021-11-05 NOTE — DATA REVIEWED
[Plain X-Rays] : plain X-Rays [MRI] : MRI [FreeTextEntry1] : MRI CSPINE 10/2021: C5-6 disc-osteophyte complex with bilateral foraminal narrowing.\par XR KNEES 10/2021: Bilateral patella ian. Preserved joint spaces.

## 2021-11-05 NOTE — ASSESSMENT
[FreeTextEntry1] : Impression:\par 1. Cervicalgia/Myofascial pain\par 2. Bilateral Patella Staci\par \par Plan: The history and physical examination were reviewed along with new imaging. Symptoms do not appear to be CSpine in etiology based on imaging findings correlated with exam findings. The patient does have a predominant muscular/myofascial component of her neck pain. The imaging and diagnosis were discussed with the patient along with treatment options including physical therapy, oral medication, interventional spine procedures, and surgery; as well as alternative therapeutics such as acupuncture and massage. I provided a referral to participate in PT, along with acupuncture/massage for her neck. She will see Neurology for further evaluation/treatment of her BUE numbness/tingling. The patient will follow up in 2-3 months. The patient expressed verbal understanding and is in agreement with the plan of care. All of the patient's questions and concerns were addressed during today's visit.

## 2021-11-05 NOTE — HISTORY OF PRESENT ILLNESS
[FreeTextEntry1] : Ms. HEATH ARREOLA is a very pleasant 71 year RHD female who comes in for MRI review and follow up of Neck Pain that has been ongoing for years without any specific injury or inciting event. Patient has tried Chiropractor, Acupuncture, and TPI which helped temporarily. The pain is located primarily on her Neck radiating to BILATERAL ARMS intermittent and described as tightness and sharp with numbness/tingling. The pain is rated as 4/10 and ranges from 4-7/10. The patient's symptoms are aggravated by sitting, playing Guitar and alleviated by PT. 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 The numbness sometimes lasts for an hour and then resolves. She denies any night pain, weakness, or bowel/bladder dysfunction.\par

## 2021-11-08 ENCOUNTER — APPOINTMENT (OUTPATIENT)
Dept: RADIOLOGY | Facility: CLINIC | Age: 71
End: 2021-11-08

## 2021-11-08 ENCOUNTER — APPOINTMENT (OUTPATIENT)
Dept: MRI IMAGING | Facility: CLINIC | Age: 71
End: 2021-11-08

## 2021-11-11 ENCOUNTER — NON-APPOINTMENT (OUTPATIENT)
Age: 71
End: 2021-11-11

## 2021-11-12 ENCOUNTER — APPOINTMENT (OUTPATIENT)
Dept: OTOLARYNGOLOGY | Facility: CLINIC | Age: 71
End: 2021-11-12
Payer: MEDICARE

## 2021-11-12 DIAGNOSIS — H90.3 SENSORINEURAL HEARING LOSS, BILATERAL: ICD-10-CM

## 2021-11-12 DIAGNOSIS — M26.609 UNSPECIFIED TEMPOROMANDIBULAR JOINT DISORDER: ICD-10-CM

## 2021-11-12 DIAGNOSIS — H93.13 TINNITUS, BILATERAL: ICD-10-CM

## 2021-11-12 DIAGNOSIS — J00 ACUTE NASOPHARYNGITIS [COMMON COLD]: ICD-10-CM

## 2021-11-12 PROCEDURE — 99214 OFFICE O/P EST MOD 30 MIN: CPT

## 2021-11-12 NOTE — CONSULT LETTER
[Dear  ___] : Dear  [unfilled], [Courtesy Letter:] : I had the pleasure of seeing your patient, [unfilled], in my office today. [Please see my note below.] : Please see my note below. [Consult Closing:] : Thank you very much for allowing me to participate in the care of this patient.  If you have any questions, please do not hesitate to contact me. [Sincerely,] : Sincerely, [FreeTextEntry3] : Ashanti Velasquez MD\par

## 2021-11-12 NOTE — ASSESSMENT
[FreeTextEntry1] : She has a history of bilateral tinnitus which may be worse in the left ear at this time. She also has a history of otalgia and disequilibrium. She has cervical disc disease and TMJ dysfunction which are likely causing the exacerbation of the tinnitus. Her ears were normal on exam. Her recent audiogram was reviewed.\par \par PLAN\par \par -findings and management options discussed in detail with the patient. \par -good aural hygiene\par -Avoid using cotton swabs in the ears\par -Annual audiogram\par -I gave her literature regarding tinnitus. She could consider tinnitus therapy. \par -Dental followup for management of her alignment of the teeth and TMJ dysfunction\par -Continue physical therapy. I also recommend physical therapy for the TMJ D.\par -Neurology evaluation is pending\par -We discussed obtaining an MRI with and without contrast of the brain and IACs. She would like to proceed with this.\par -Continue Dymista for the nasal congestion\par -She may try Breathe Right strips. She was cautioned that they could cause irritation of the skin\par -Followup or call for the MRI results\par -call and return earlier if any concerns. \par

## 2021-11-12 NOTE — HISTORY OF PRESENT ILLNESS
[de-identified] : HEATH ARREOLA is a 71 year patient With a history of neck pain, TMJ dysfunction, chronic rhinitis, bilateral high-pitched sensorineural hearing loss, and tinnitus. I was asked to see her by Dr. Renee for an opinion regarding her tinnitus. She has a history chronic tinnitus. It has been exacerbated over the past 6 weeks. She did not think there was a preceding event. She has mild otalgia and a little bit of dizziness. She has no otorrhea. She describes being off balance rather than having vertigo. She does have a problem with her C-spine. She was evaluated for PT. She is also been undergoing some dental adjustment of the alignment of her teeth. She has mild nasal congestion. She uses Dymista. She has no history of recurrent ear infections or prior otologic surgery. Her chart including the CT scan and audiogram were reviewed. She did have a CT scan of the C-spine. She is waiting for appointment to see a neurologist\par \par

## 2021-11-15 PROBLEM — H92.01 RIGHT EAR PAIN: Status: ACTIVE | Noted: 2021-10-12

## 2021-11-15 PROBLEM — G44.209 TENSION-TYPE HEADACHE, NOT INTRACTABLE, UNSPECIFIED CHRONICITY PATTERN: Status: ACTIVE | Noted: 2020-09-22

## 2021-11-15 PROBLEM — H93.11 TINNITUS OF RIGHT EAR: Status: ACTIVE | Noted: 2021-10-12

## 2021-11-15 NOTE — REVIEW OF SYSTEMS
[Ear Pain] : ear pain [Vertigo] : vertigo [Ear Noises] : ear noises [Nasal Congestion] : nasal congestion [Negative] : Heme/Lymph [Patient Intake Form Reviewed] : Patient intake form was reviewed [de-identified] : headache

## 2021-11-15 NOTE — HISTORY OF PRESENT ILLNESS
[de-identified] : HEATH ARREOLA is a 71 year woman with a history of Neck and ear pain. She is having increased bilateral tinnitus and nasal/hed congestion.

## 2021-11-15 NOTE — ASSESSMENT
[FreeTextEntry1] : HEATH ARREOLA has continued/increased. congestion and louder tinnitus. I suggested continuing Dymista and speaking in 72 hour

## 2021-11-30 ENCOUNTER — NON-APPOINTMENT (OUTPATIENT)
Age: 71
End: 2021-11-30

## 2021-12-02 ENCOUNTER — APPOINTMENT (OUTPATIENT)
Dept: NEUROLOGY | Facility: CLINIC | Age: 71
End: 2021-12-02
Payer: MEDICARE

## 2021-12-02 VITALS
WEIGHT: 125.25 LBS | RESPIRATION RATE: 16 BRPM | DIASTOLIC BLOOD PRESSURE: 78 MMHG | HEIGHT: 62 IN | HEART RATE: 84 BPM | SYSTOLIC BLOOD PRESSURE: 134 MMHG | TEMPERATURE: 98 F | BODY MASS INDEX: 23.05 KG/M2 | OXYGEN SATURATION: 94 %

## 2021-12-02 PROCEDURE — 99204 OFFICE O/P NEW MOD 45 MIN: CPT

## 2021-12-02 RX ORDER — ATORVASTATIN CALCIUM 20 MG/1
20 TABLET, FILM COATED ORAL DAILY
Refills: 0 | Status: ACTIVE | COMMUNITY

## 2021-12-02 RX ORDER — AZELASTINE HYDROCHLORIDE AND FLUTICASONE PROPIONATE 137; 50 UG/1; UG/1
137-50 SPRAY, METERED NASAL
Refills: 0 | Status: DISCONTINUED | COMMUNITY
End: 2021-12-02

## 2021-12-02 NOTE — HISTORY OF PRESENT ILLNESS
[FreeTextEntry1] : Has had longstanding neck and back issues.  Has been working from home for the past year leaning over the computer and/or piano, has started feeling more of a twinge in the left side of her neck radiating down the left arm. Often wakes up lying on her stomach with arms overhead and numbness running down both arms.  Recently started acupuncture and physical therapy for neck symptoms.  \par \par Over the past year has also noticed ringing in the left > right ear.  No hearing loss.  When she turns her head to the side she feels a little dizzy and off balance, but not spinning.  Has been evaluated by Dr. Renee and Dr. Velasquez from ENT.  \par

## 2021-12-02 NOTE — CONSULT LETTER
[Dear  ___] : Dear  [unfilled], [Consult Letter:] : I had the pleasure of evaluating your patient, [unfilled]. [Please see my note below.] : Please see my note below. [Consult Closing:] : Thank you very much for allowing me to participate in the care of this patient.  If you have any questions, please do not hesitate to contact me. [Sincerely,] : Sincerely, [FreeTextEntry2] : Gilberto Renee MD [FreeTextEntry3] : Tiera Albright MD [___] : [unfilled]

## 2021-12-02 NOTE — REASON FOR VISIT
[Initial Evaluation] : an initial evaluation [FreeTextEntry1] : tinnitus, neck pain, upper extremity numbness

## 2021-12-02 NOTE — ASSESSMENT
[FreeTextEntry1] : 1) Neck pain \par -MRI C spine shows mild degenerative disease\par -Suspect pain is more musculoskeletal\par -Continue physical therapy and acupuncture\par -Reviewed optimal arm position for keyboard use to prevent neck and upper back pain\par -Consider muscle relaxant such as flexeril if symptoms do not improve\par \par 2) Upper extremity numbness\par -UE numbness is not due to radiculopathy as there is no evidence of nerve impingement on MRI, possibly transient radial nerve palsy due to sleeping position\par \par 3) Tinnitus, possible mild BPPV\par -No lesions on MRI\par -Continuous, non-pulsatile, no need for vascular imaging at this time\par -Trial of low salt diet, can consider diuretic if worsening symptoms

## 2021-12-02 NOTE — DATA REVIEWED
[de-identified] : MRI brain independently reviewed, no acoustic neuromas or other lesion to explain tinnitus, mild white matter micrvascular disease\par MRI C spine independently reviewed, no cord signal change or nerve impingement

## 2022-02-24 ENCOUNTER — APPOINTMENT (OUTPATIENT)
Dept: OTOLARYNGOLOGY | Facility: CLINIC | Age: 72
End: 2022-02-24
Payer: MEDICARE

## 2022-02-24 DIAGNOSIS — M54.2 CERVICALGIA: ICD-10-CM

## 2022-02-24 PROCEDURE — 31575 DIAGNOSTIC LARYNGOSCOPY: CPT

## 2022-02-24 PROCEDURE — 99213 OFFICE O/P EST LOW 20 MIN: CPT | Mod: 25

## 2022-02-24 NOTE — ASSESSMENT
[FreeTextEntry1] : HEATH ARREOLA is feeling better. She si having LPR. I suggested and discussed in detail a strict reflux diet and gave the patient a handout.\par I am recommending Pepcid 20mg  before bedtime.\par \par

## 2022-02-24 NOTE — HISTORY OF PRESENT ILLNESS
[de-identified] : HEATH ARREOLA is a 72 year woman with a history of musculoskeletal problems which are improving with PT. She recently started singing lessons. She wants to make sure she doesn’t have polyps. She is having reflux.

## 2022-03-14 ENCOUNTER — APPOINTMENT (OUTPATIENT)
Dept: PHYSICAL MEDICINE AND REHAB | Facility: CLINIC | Age: 72
End: 2022-03-14
Payer: MEDICARE

## 2022-03-14 VITALS — HEIGHT: 62 IN | WEIGHT: 125 LBS | BODY MASS INDEX: 23 KG/M2

## 2022-03-14 DIAGNOSIS — R20.0 ANESTHESIA OF SKIN: ICD-10-CM

## 2022-03-14 DIAGNOSIS — M54.50 LOW BACK PAIN, UNSPECIFIED: ICD-10-CM

## 2022-03-14 PROCEDURE — 99214 OFFICE O/P EST MOD 30 MIN: CPT

## 2022-03-15 PROBLEM — R20.0 BILATERAL LEG NUMBNESS: Status: ACTIVE | Noted: 2022-03-14

## 2022-03-15 NOTE — HISTORY OF PRESENT ILLNESS
[FreeTextEntry1] : Ms. HEATH ARREOLA is a very pleasant 71 year RHD female who comes in for follow up of Neck Pain radiating to BUE. She has been doing PT which initially was providing relief however recently symptoms have again worsened. She continues to experience neck pain with radiating numbness into bilateral arms/hands, in addition she is now feeling numbness going down both legs. Thus far MRI CSPINE and BRAIN have been inconclusive. The patient's symptoms are worse in the morning upon waking, aggravated by sitting, playing Guitar and alleviated by PT. The patient is a Retired Teacher. The numbness sometimes lasts for an hour and then resolves. She denies any night pain, weakness, or bowel/bladder dysfunction.\par

## 2022-03-15 NOTE — ASSESSMENT
[FreeTextEntry1] : Impression:\par 1. Cervicalgia/Myofascial pain\par 2. Idiopathic Neuropathy\par \par \par Plan: The history and physical examination were reviewed along with imaging. Symptoms remain unclear, CSpine imaging does not lead to clear spine etiology, present exam findings do not indicate LSpine etiology for BLE symptoms either. The neck pain continues to have a predominant muscular/myofascial component, however the BUE and BLE paresthesias do not correlate with exam or imaging. We could consider MRI LSPINE to rule out any potential pathology, however this does not seem to be most likely. She will follow up with Neurology for further evaluation/treatment and consideration of EMG/NCV. I have advised her to hold off on PT for now. The patient will follow up as needed. The patient expressed verbal understanding and is in agreement with the plan of care. All of the patient's questions and concerns were addressed during today's visit.

## 2022-03-16 ENCOUNTER — OUTPATIENT (OUTPATIENT)
Dept: OUTPATIENT SERVICES | Facility: HOSPITAL | Age: 72
LOS: 1 days | End: 2022-03-16

## 2022-03-16 ENCOUNTER — RESULT REVIEW (OUTPATIENT)
Age: 72
End: 2022-03-16

## 2022-03-16 ENCOUNTER — APPOINTMENT (OUTPATIENT)
Dept: MRI IMAGING | Facility: CLINIC | Age: 72
End: 2022-03-16
Payer: MEDICARE

## 2022-03-16 DIAGNOSIS — Z41.1 ENCOUNTER FOR COSMETIC SURGERY: Chronic | ICD-10-CM

## 2022-03-16 DIAGNOSIS — Z98.89 OTHER SPECIFIED POSTPROCEDURAL STATES: Chronic | ICD-10-CM

## 2022-03-16 PROCEDURE — G1004: CPT

## 2022-03-16 PROCEDURE — 72148 MRI LUMBAR SPINE W/O DYE: CPT | Mod: 26,ME

## 2022-05-06 ENCOUNTER — NON-APPOINTMENT (OUTPATIENT)
Age: 72
End: 2022-05-06

## 2022-05-09 ENCOUNTER — APPOINTMENT (OUTPATIENT)
Dept: NEUROLOGY | Facility: CLINIC | Age: 72
End: 2022-05-09
Payer: MEDICARE

## 2022-05-09 VITALS
TEMPERATURE: 98 F | DIASTOLIC BLOOD PRESSURE: 85 MMHG | OXYGEN SATURATION: 96 % | HEIGHT: 62 IN | SYSTOLIC BLOOD PRESSURE: 149 MMHG | HEART RATE: 82 BPM | WEIGHT: 125 LBS | BODY MASS INDEX: 23 KG/M2

## 2022-05-09 DIAGNOSIS — R29.898 OTHER SYMPTOMS AND SIGNS INVOLVING THE MUSCULOSKELETAL SYSTEM: ICD-10-CM

## 2022-05-09 PROCEDURE — 99214 OFFICE O/P EST MOD 30 MIN: CPT

## 2022-05-09 RX ORDER — HYDROCORTISONE 2.5% 25 MG/G
2.5 CREAM TOPICAL
Qty: 30 | Refills: 0 | Status: ACTIVE | COMMUNITY
Start: 2022-03-14

## 2022-05-09 RX ORDER — KETOCONAZOLE 20 MG/G
2 CREAM TOPICAL
Qty: 60 | Refills: 0 | Status: ACTIVE | COMMUNITY
Start: 2022-05-04

## 2022-05-09 RX ORDER — HYDROCORTISONE 25 MG/G
2.5 CREAM TOPICAL
Qty: 30 | Refills: 0 | Status: ACTIVE | COMMUNITY
Start: 2022-05-04

## 2022-05-09 NOTE — REASON FOR VISIT
PEDIATRIC ILLNESS VISIT   3/17/2020        Roomed by: Kayleen Villegas MD      SUBJECTIVE   accompanied by mother  Sunshine is a 7 year old female who is complaining of left eye drainage and cough.  Present for a day with eye drainage and congestion for 4 days and is worsening.  Present treatments include - OTC meds for fever.   Previous medical contacts for the problem - none  Symptoms:  Fever: fever to 101 the first couple days  Review of Systems   Constitutional: Positive for activity change and appetite change.   HENT: Positive for congestion.    Eyes: Positive for discharge and itching.   Respiratory: Positive for cough.    Cardiovascular: Negative.    Gastrointestinal: Negative.    Skin: Negative.      Allergies:  ALLERGIES:  No Known Allergies    Current Outpatient Medications   Medication Sig Dispense Refill   • tobramycin (TOBREX) 0.3 % ophthalmic solution Place 2 drops into both eyes 3 times daily. For 5-7 days 5 mL 0   • amoxicillin (AMOXIL) 400 MG/5ML suspension 10 ml by mouth twice per day for 10 days 200 mL 0   • ciprofloxacin (CILOXAN) 0.3 % ophthalmic solution Place 2 drops into both eyes 3 times daily. For 5 -7 days 2.5 mL 1     No current facility-administered medications for this visit.        Family History   Problem Relation Age of Onset   • Patient is unaware of any medical problems Mother    • Patient is unaware of any medical problems Father    • Patient is unaware of any medical problems Maternal Grandmother    • Patient is unaware of any medical problems Maternal Grandfather    • Patient is unaware of any medical problems Paternal Grandmother    • Patient is unaware of any medical problems Paternal Grandfather    • Cancer Neg Hx    • Coronary Artery Disease Neg Hx    • Diabetes Neg Hx      No other family members have acute illness     Social history:   Attends school    OBJECTIVE:   Visit Vitals  Pulse 100   Temp 98.6 °F (37 °C) (Temporal)   Resp 24   Wt 19.1 kg (42 lb)     Physical  Exam  Constitutional:       Appearance: She is well-developed.   HENT:      Right Ear: A middle ear effusion is present.      Left Ear: A middle ear effusion is present.      Nose: Mucosal edema, congestion and rhinorrhea present.      Mouth/Throat:      Mouth: Mucous membranes are moist.      Pharynx: Posterior oropharyngeal erythema present. No oropharyngeal exudate.   Eyes:      General:         Right eye: Discharge present.         Left eye: Discharge present.     Comments: Conjunctivae red x 2   Pulmonary:      Effort: Pulmonary effort is normal.      Breath sounds: Normal breath sounds.   Neurological:      Mental Status: She is alert.         ASSESSMENT/PLAN    Conjunctivitis -  antibiotic eye drops - see orders - side effects of medication discussed, warm, moist compresses frequently  Otitis Media -  prescription oral antibiotics - see orders - side effects of medication discussed, acetaminophen/ibuprofen as needed for pain and fever  Orders Placed This Encounter   • tobramycin (TOBREX) 0.3 % ophthalmic solution   • amoxicillin (AMOXIL) 400 MG/5ML suspension         Medication changes: Yes    Describe: The caretaker was informed of the benefits and side affects and was in agreement to start treatment.  Also it was discussed when to follow up if not improving within a certain time frame or worsening.        Immunizations given today? No  See Orders:  Instructed to call if the problem worsens or does not improve within the next 24 to 48 hours.    Schedule follow-up: sara Villegas MD         [Follow-Up: _____] : a [unfilled] follow-up visit

## 2022-05-09 NOTE — HISTORY OF PRESENT ILLNESS
[FreeTextEntry1] : Continues to have intermittent numbness, mostly in the hands and arms but occasionally in the legs as well.  Often wakes up with numb arms, thinks it may be related to sleeping position.  Thinks that her hand/arm position during laptop use or while playing guitar may contribute as well.  Has not noticed any weakness in arms, hands, or legs.

## 2022-05-09 NOTE — ASSESSMENT
[FreeTextEntry1] : Migrating upper and lower extremity numbness with exacerbation.\par \par MRI C and L spine were unremarkable, ruling out myelopathy or radiculopathy, but she does have bilateral hand weakness on exam which may suggest a median or ulnar neuropathy.\par Will obtain EMG/NCS of upper extremities.\par \par RTC 4 months

## 2022-05-09 NOTE — PHYSICAL EXAM
[FreeTextEntry1] : Physical Exam\par Constitutional: no apparent distress\par Psychiatric: normal affect, euthymic, alert and oriented x 3\par \par Neurologic Exam:\par Mental Status: awake and alert, speech fluent and prosodic with no paraphasic errors\par Cranial Nerves: tracks in all directions, face symmetric, no dysarthria\par Motor: normal bulk and tone, no orbiting or pronator drift, power 5/5 to confrontation throughout including shoulder abduction, elbow flexion and extension, wrist flexion and extension, 4+/5 finger abduction, finger adduction, thumb abduction, 5/5 hip flexion, knee flexion and extension, plantarflexion, dorsiflexion\par Sensation: intact to light touch in distal upper and lower extremities bilaterally; intact in medial, ulnar, and radial distributions bilaterally\par Gait: narrow base, normal stance and stride, normal arm swing, normal tandem, negative Romberg\par

## 2022-05-09 NOTE — DATA REVIEWED
[de-identified] : MRI C spine and L spine independently reviewed and reviewed with patient.  Few small disc herniations noted.  No cord or nerve root compression.

## 2022-05-10 ENCOUNTER — APPOINTMENT (OUTPATIENT)
Dept: OTOLARYNGOLOGY | Facility: CLINIC | Age: 72
End: 2022-05-10
Payer: MEDICARE

## 2022-05-10 VITALS — HEIGHT: 62 IN | TEMPERATURE: 96.6 F | WEIGHT: 127 LBS | BODY MASS INDEX: 23.37 KG/M2

## 2022-05-10 DIAGNOSIS — J00 ACUTE NASOPHARYNGITIS [COMMON COLD]: ICD-10-CM

## 2022-05-10 PROCEDURE — 99213 OFFICE O/P EST LOW 20 MIN: CPT | Mod: 25

## 2022-05-10 PROCEDURE — 31231 NASAL ENDOSCOPY DX: CPT

## 2022-05-10 NOTE — HISTORY OF PRESENT ILLNESS
[de-identified] : HEATH ARREOLA is a 72 year woman with a history of numbness arms and legs. MRI was negative. She is having EMG tomorrow. Her ears are stuffy.

## 2022-05-10 NOTE — REVIEW OF SYSTEMS
[Nasal Congestion] : nasal congestion [de-identified] : tinnitus, pressure [de-identified] : heartburn

## 2022-05-11 ENCOUNTER — APPOINTMENT (OUTPATIENT)
Dept: NEUROLOGY | Facility: CLINIC | Age: 72
End: 2022-05-11
Payer: MEDICARE

## 2022-05-11 VITALS
HEART RATE: 81 BPM | WEIGHT: 127 LBS | SYSTOLIC BLOOD PRESSURE: 126 MMHG | TEMPERATURE: 97.4 F | DIASTOLIC BLOOD PRESSURE: 77 MMHG | HEIGHT: 62 IN | BODY MASS INDEX: 23.37 KG/M2 | OXYGEN SATURATION: 96 %

## 2022-05-11 DIAGNOSIS — R20.0 ANESTHESIA OF SKIN: ICD-10-CM

## 2022-05-11 PROCEDURE — 95912 NRV CNDJ TEST 11-12 STUDIES: CPT

## 2022-05-11 PROCEDURE — 99213 OFFICE O/P EST LOW 20 MIN: CPT | Mod: 25

## 2022-05-11 NOTE — PROCEDURE
[FreeTextEntry1] : \par Nerve Conduction and Electromyography Report\par  [FreeTextEntry3] : \par Electro Physiologic Findings:\par \par Limb temperature was monitored and maintained at approximately 30 – 34° C in the lower extremities, and 32 – 36° C in the upper extremities.\par \par The median and ulnar sensory velocities were mildly reduced bilaterally; there was no focal slowing of the median velocities across the wrists. The left ulnar motor response was mildly slow across the elbow without conduction block; the right ulnar motor and bilateral median motor responses were normal. \par \par The right sural and superficial fibular sensory responses were normal. The right fibular motor response was also normal. The right fibular and bilateral median and ulnar F-wave latencies were normal. \par \par Clinical Electrophysiological Impression: \par \par This electrodiagnostic study was largely unremarkable. The median and ulnar sensory velocities were mildly reduced which may be due to cold extremities. There was a mild left ulnar nerve entrapment at the elbow, however this does not explain the majority of the patient’s symptoms. There was no evidence of polyneuropathy or median nerve entrapments at the wrists.

## 2022-05-11 NOTE — ASSESSMENT
[FreeTextEntry1] : Neurologic exam normal and NCS largely unremarkable - mild slowing of UE sensory responses that may be due to cold limbs, no definite large fiber neuropathy\par There was a mild left ulnar nerve entrapment at the elbow however that does not explain the majority of the patient's symptoms\par Given that she has no pain or functional impairment, will monitor for now, return for new or worsening symptoms\par \par See separate procedure note for full results of NCS/EMG study

## 2022-05-11 NOTE — HISTORY OF PRESENT ILLNESS
[FreeTextEntry1] : Referred by Dr. Albright for numbness in arms, hands, legs and feet\par Arms and hands worse at night - sleeps on her stomach\par Started about a year ago\par She had neck pain but no radiation into the arms; this has improved with PT \par \par Personally reviewed and interpreted:\par MRI C spine - mild disc bulges at C5/6 and C6/7 without nerve root or cord compression

## 2022-05-11 NOTE — PHYSICAL EXAM
[FreeTextEntry1] : Motor: thumb abduction 4/5 b/l, \par Sensory: light touch, pinprick, vibration sense intact and  symmetric throughout\par Reflexes: 2+ symmetric \par Gait: normal, can tandem without difficulty

## 2022-07-19 ENCOUNTER — APPOINTMENT (OUTPATIENT)
Dept: OTOLARYNGOLOGY | Facility: CLINIC | Age: 72
End: 2022-07-19

## 2022-07-19 VITALS — BODY MASS INDEX: 23.23 KG/M2 | WEIGHT: 127 LBS

## 2022-07-19 VITALS — TEMPERATURE: 95.7 F | HEIGHT: 62 IN

## 2022-07-19 DIAGNOSIS — K21.9 GASTRO-ESOPHAGEAL REFLUX DISEASE W/OUT ESOPHAGITIS: ICD-10-CM

## 2022-07-19 DIAGNOSIS — R42 DIZZINESS AND GIDDINESS: ICD-10-CM

## 2022-07-19 PROCEDURE — 99214 OFFICE O/P EST MOD 30 MIN: CPT | Mod: 25

## 2022-07-19 PROCEDURE — 31231 NASAL ENDOSCOPY DX: CPT

## 2022-07-19 RX ORDER — DIAZEPAM 10 MG/1
10 TABLET ORAL
Qty: 90 | Refills: 0 | Status: ACTIVE | COMMUNITY
Start: 2022-07-11

## 2022-07-19 NOTE — HISTORY OF PRESENT ILLNESS
[de-identified] : HEATH ARREOLA is a 72 year woman with a history of 2 weeks of dizziness.Initially she got out of bed quickly and had rotational vertigo. Her balance has been off.She is very congested.She thinks she is having LPR causing

## 2022-07-19 NOTE — REVIEW OF SYSTEMS
[Dizziness] : dizziness [Nasal Congestion] : nasal congestion [Patient Intake Form Reviewed] : Patient intake form was reviewed [de-identified] : ear congestion

## 2022-07-19 NOTE — ASSESSMENT
[FreeTextEntry1] : HEATH ARREOLA may be having LPR. I suggested and discussed in detail a strict reflux diet and gave the patient a handout.\par \par I am recommending Pepcid 20mg  bid.\par \par I will refer her for Vestibular therapy.\par \par \par

## 2022-07-19 NOTE — REASON FOR VISIT
[Subsequent Evaluation] : a subsequent evaluation for [FreeTextEntry2] : reflux | dizziness | ear and nose congestion

## 2022-09-08 ENCOUNTER — APPOINTMENT (OUTPATIENT)
Dept: OTOLARYNGOLOGY | Facility: CLINIC | Age: 72
End: 2022-09-08

## 2022-09-08 VITALS — WEIGHT: 127 LBS | HEIGHT: 62 IN | BODY MASS INDEX: 23.37 KG/M2 | TEMPERATURE: 98 F

## 2022-09-08 DIAGNOSIS — K21.9 GASTRO-ESOPHAGEAL REFLUX DISEASE W/OUT ESOPHAGITIS: ICD-10-CM

## 2022-09-08 DIAGNOSIS — H61.23 IMPACTED CERUMEN, BILATERAL: ICD-10-CM

## 2022-09-08 DIAGNOSIS — H92.09 OTALGIA, UNSPECIFIED EAR: ICD-10-CM

## 2022-09-08 PROCEDURE — 31575 DIAGNOSTIC LARYNGOSCOPY: CPT

## 2022-09-08 PROCEDURE — 69210 REMOVE IMPACTED EAR WAX UNI: CPT

## 2022-09-08 PROCEDURE — 99214 OFFICE O/P EST MOD 30 MIN: CPT | Mod: 25

## 2022-09-08 NOTE — HISTORY OF PRESENT ILLNESS
[de-identified] : HEATH ARREOLA is a 72 year woman with a history of dizziness in [place]. She is having some chest discomfort and will be seeing Dr. Alex.. Over all she is feeling very tired. She has right sided nasal congestion intermittently.

## 2022-09-12 ENCOUNTER — APPOINTMENT (OUTPATIENT)
Dept: NEUROLOGY | Facility: CLINIC | Age: 72
End: 2022-09-12

## 2022-11-14 ENCOUNTER — APPOINTMENT (OUTPATIENT)
Dept: NEUROLOGY | Facility: CLINIC | Age: 72
End: 2022-11-14

## 2023-01-04 ENCOUNTER — APPOINTMENT (OUTPATIENT)
Dept: NEUROLOGY | Facility: CLINIC | Age: 73
End: 2023-01-04
Payer: MEDICARE

## 2023-01-04 VITALS
OXYGEN SATURATION: 96 % | SYSTOLIC BLOOD PRESSURE: 127 MMHG | HEART RATE: 82 BPM | BODY MASS INDEX: 23.55 KG/M2 | TEMPERATURE: 96.9 F | HEIGHT: 62 IN | DIASTOLIC BLOOD PRESSURE: 79 MMHG | WEIGHT: 128 LBS

## 2023-01-04 PROCEDURE — 99213 OFFICE O/P EST LOW 20 MIN: CPT

## 2023-01-04 NOTE — DATA REVIEWED
[de-identified] : MRI brain 11/23/2021 minimal microvascular disease\par MRI C spine 10/27/2021 multilevel cervical spondylosis without cord or nerve root compression\par MRI L spine 3/16/2022 Degenerative disc disease with small left paracentral disc herniation at L2/3 and small central disc herniation at L5/S1.\par  [de-identified] : EMG/NCS 5/11/2022\par NCS largely unremarkable - mild slowing of UE sensory responses that may be due to cold limbs, no definite large fiber neuropathy\par There was a mild left ulnar nerve entrapment at the elbow however that does not explain the majority of the patient's symptoms

## 2023-01-04 NOTE — HISTORY OF PRESENT ILLNESS
[FreeTextEntry1] : Since last visit had nerve conduction study with Dr. Murray which was unremarkable.  Has also had extensive GI and cardiology evaluation.  Continues to have intermittnent numbness in hands and arms, especially when lying on her side on the couch.

## 2023-01-04 NOTE — ASSESSMENT
[FreeTextEntry1] : Intermittent migrating numbness, with normal MRI brain/C spine/L spine and normal nerve conduction study.\par While I do not have a clear explanation for her symptoms, her normal exam and unremarkable work up are reassuring.

## 2023-01-04 NOTE — PHYSICAL EXAM
[FreeTextEntry1] : Physical Exam\par Constitutional: no apparent distress\par Psychiatric: normal affect, euthymic, alert and oriented x 3\par \par Neurologic Exam:\par Mental Status: awake and alert, speech fluent and prosodic with no paraphasic errors\par Cranial Nerves: tracks in all directions, face symmetric, no dysarthria\par Motor: normal bulk and tone, no orbiting or pronator drift, power 5/5 to confrontation throughout including shoulder abduction, elbow flexion and extension, wrist flexion and extension, hip flexion, knee flexion and extension, no abnormal movements\par Sensation: intact to light touch in distal upper and lower extremities bilaterally\par Coordination: finger-nose-finger intact bilaterally\par Reflexes: 2+ biceps, triceps, brachioradialis, patella\par Gait: narrow base, normal stance and stride, normal arm swing, normal tandem, negative Romberg\par

## 2023-04-09 NOTE — REVIEW OF SYSTEMS
PROGRESS NOTE    Karis Nj 28 year old  PPD #1 s/p . Today she is doing well. Her pain is well controlled with oral medications. She is ambulating without difficulty. She is passing flatus, voiding spontaneously, and tolerating PO without nausea or vomiting. No acute events overnight. She is breast and bottle feeding without difficulty.     VITALS:     Vital Last Value 24 Hour Range   Temperature 97.8 °F (36.6 °C) (23 1200) Temp  Min: 97.5 °F (36.4 °C)  Max: 98.8 °F (37.1 °C)   Pulse 80 (23 1200) Pulse  Min: 61  Max: 85   Respiratory 18 (23 1200) Resp  Min: 18  Max: 19   Non-Invasive  Blood Pressure 109/70 (23 1200) BP  Min: 88/52  Max: 109/70   Pulse Oximetry 99 % (23 0800) SpO2  Min: 99 %  Max: 100 %   Arterial   Blood Pressure   No data recorded     Examination of the patient reveals:   General: Well developed, well nourished, in no acute distress   Head: normocephalic and atraumatic   Lungs: clear bilaterally to auscultation   Heart: regular rate and rhythm, S1, S2 without murmurs   Abdomen: Soft, non distended, and appropriately tender without masses. Normoactive bowel sounds with no rebound or guarding. Uterus is firm and sub-umbilical.   Neurologic: no focal deficits   Skin: incision is clean, dry, and intact, with no erythema   Psych: alert and oriented X 3; cooperative; normal mood and affect; normal attention span and concentration.     Prenatal Labs  Lab Results   Component Value Date    ABORHDABR O Rh Positive 2023    AS Negative 2023    RPR Reactive (A) 2023       Admission Labs  Admission on 2023   Component Date Value Ref Range Status   • HIV Special Screen 2023 Nonreactive  Nonreactive Final    Negative for HIV-1 or HIV-2 antibodies and HIV-1 p24 antigen.   • PAMG1, Amnisure ROM 2023 Positive (A)  Negative Final    A positive result is indicative of the presence of amniotic fluid in vaginal secretions. There is a rupture.    • RPR 04/08/2023 Reactive (A)  Nonreactive Final    See Directory of Services for interpretation of syphilis testing algorithm.   • WBC 04/08/2023 6.9  4.2 - 11.0 K/mcL Final   • RBC 04/08/2023 3.30 (A)  4.00 - 5.20 mil/mcL Final   • HGB 04/08/2023 7.2 (A)  12.0 - 15.5 g/dL Final   • HCT 04/08/2023 22.7 (A)  36.0 - 46.5 % Final   • MCV 04/08/2023 68.8 (A)  78.0 - 100.0 fl Final   • MCH 04/08/2023 21.8 (A)  26.0 - 34.0 pg Final   • MCHC 04/08/2023 31.7 (A)  32.0 - 36.5 g/dL Final   • PLT 04/08/2023 258  140 - 450 K/mcL Final   • RDW-CV 04/08/2023 17.3 (A)  11.0 - 15.0 % Final   • RDW-SD 04/08/2023 42.9  39.0 - 50.0 fL Final   • NRBC 04/08/2023 1 (A)  <=0 /100 WBC Final   • ABO/RH(D) 04/08/2023 O Rh Positive   Final   • ANTIBODY SCREEN 04/08/2023 Negative   Final   • TYPE AND SCREEN EXPIRATION DATE 04/08/2023 04/11/2023 23:59   Final   • BASE EXCESS / DEFICIT, VENOUS CORD* 04/08/2023 -5  mmol/L Final   • HCO3, VENOUS CORD BLOOD - RESPIRAT* 04/08/2023 19 (A)  22 - 29 mmol/L Final   • PCO2, VENOUS CORD BLOOD - RESPIRAT* 04/08/2023 31  23 - 49 mm Hg Final   • PH, VENOUS CORD BLOOD - RESPIRATORY 04/08/2023 7.39  7.25 - 7.45 Units Final   • PO2, VENOUS CORD BLOOD - RESPIRATO* 04/08/2023 32  17 - 41 mm Hg Final   • COLOR, URINALYSIS 04/08/2023 Straw   Final   • APPEARANCE, URINALYSIS 04/08/2023 Clear   Final   • GLUCOSE, URINALYSIS 04/08/2023 Negative  Negative mg/dL Final   • BILIRUBIN, URINALYSIS 04/08/2023 Negative  Negative Final   • KETONES, URINALYSIS 04/08/2023 Negative  Negative mg/dL Final   • SPECIFIC GRAVITY, URINALYSIS 04/08/2023 1.015  1.005 - 1.030 Final    Measured by refractometry   • OCCULT BLOOD, URINALYSIS 04/08/2023 Trace (A)  Negative Final   • PH, URINALYSIS 04/08/2023 7.0  5.0 - 7.0 Final   • PROTEIN, URINALYSIS 04/08/2023 Trace (A)  Negative mg/dL Final   • UROBILINOGEN, URINALYSIS 04/08/2023 0.2  0.2, 1.0 mg/dL Final   • NITRITE, URINALYSIS 04/08/2023 Negative  Negative Final   • LEUKOCYTE  ESTERASE, URINALYSIS 04/08/2023 Negative  Negative Final   • SQUAMOUS EPITHELIAL, URINALYSIS 04/08/2023 1 to 5  None Seen, 1 to 5 /hpf Final   • ERYTHROCYTES, URINALYSIS 04/08/2023 11 to 25 (A)  None Seen, 1 to 2 /hpf Final   • LEUKOCYTES, URINALYSIS 04/08/2023 1 to 5  None Seen, 1 to 5 /hpf Final   • BACTERIA, URINALYSIS 04/08/2023 None Seen  None Seen /hpf Final   • HYALINE CASTS, URINALYSIS 04/08/2023 None Seen  None Seen, 1 to 5 /lpf Final   • MUCUS 04/08/2023 Present   Final   • Amphetamines, Urine 04/08/2023 Negative  Negative Final    Cutoff = 500 ng/mL   • Barbiturates, Urine 04/08/2023 Negative  Negative Final    Cutoff = 200 ng/mL   • Benzodiazepines, Urine 04/08/2023 Negative  Negative Final    Cutoff = 200 ng/mL   • Cocaine/ Metabolite, Urine 04/08/2023 Negative  Negative Final    Cutoff = 150 ng/ml   • Opiates, Urine 04/08/2023 Negative  Negative Final    Cutoff = 300 ng/mL   • Phencyclidine, Urine 04/08/2023 Negative  Negative Final    Cutoff = 25 ng/mL   • Cannabinoids, Urine 04/08/2023 Positive (A)  Negative Final    Cutoff = 50 ng/mL   • Treponema Pallidum Antibody, IgG a* 04/08/2023 Reactive (A)  Nonreactive Final    This is a reportable disease.  Please forward the appropriate form to the public health authorities.  See Directory of Services for interpretation of syphilis testing algorithm.   • RPR, Quantitative 04/08/2023 1:2 (A)  <1:1 Final   • WBC 04/09/2023 20.6 (A)  4.2 - 11.0 K/mcL Final   • RBC 04/09/2023 2.83 (A)  4.00 - 5.20 mil/mcL Final   • HGB 04/09/2023 6.2 (A)  12.0 - 15.5 g/dL Final    This result has been called to JAVY OCHOA by Nick Padilla on 04 09 2023 at 0732, and has been read back.    • HCT 04/09/2023 19.6 (A)  36.0 - 46.5 % Final   • MCV 04/09/2023 69.3 (A)  78.0 - 100.0 fl Final   • MCH 04/09/2023 21.9 (A)  26.0 - 34.0 pg Final   • MCHC 04/09/2023 31.6 (A)  32.0 - 36.5 g/dL Final   • RDW-CV 04/09/2023 17.6 (A)  11.0 - 15.0 % Final   • RDW-SD 04/09/2023 44.2  39.0 - 50.0  fL Final   • PLT 2023 238  140 - 450 K/mcL Final   • NRBC 2023 0  <=0 /100 WBC Final   • Neutrophil, Percent 2023 77  % Final   • Lymphocytes, Percent 2023 12  % Final   • Mono, Percent 2023 10  % Final   • Eosinophils, Percent 2023 0  % Final   • Basophils, Percent 2023 0  % Final   • Immature Granulocytes 2023 1  % Final   • Absolute Neutrophils 2023 16.0 (A)  1.8 - 7.7 K/mcL Final   • Absolute Lymphocytes 2023 2.4  1.0 - 4.8 K/mcL Final   • Absolute Monocytes 2023 2.0 (A)  0.3 - 0.9 K/mcL Final   • Absolute Eosinophils  2023 0.0  0.0 - 0.5 K/mcL Final   • Absolute Basophils 2023 0.0  0.0 - 0.3 K/mcL Final   • Absolute Immature Granulocytes 2023 0.2  0.0 - 0.2 K/mcL Final       Immunizations    There is no immunization history on file for this patient.      Assessment:   Karis Nj 28 year old  PPD #1 s/p        Plan:   1. Postop care: Routine Advancements. Continue to increase ambulation.    2. Infant: VMI. Pt breat and bottle feeding without difficulty.  declines circumcision  3. PPBC: undecided   4. Anemia- chronic anemia, +SC trait, no on going blood loss, continue ferrous sulfate  5. Disposition: Anticipate discharge home on PPD #2. With follow up in 1 weeks.       Heraclio Valadez MD    [Patient Intake Form Reviewed] : Patient intake form was reviewed

## 2023-09-07 ENCOUNTER — NON-APPOINTMENT (OUTPATIENT)
Age: 73
End: 2023-09-07

## 2023-09-09 ENCOUNTER — OUTPATIENT (OUTPATIENT)
Dept: OUTPATIENT SERVICES | Facility: HOSPITAL | Age: 73
LOS: 1 days | End: 2023-09-09

## 2023-09-09 ENCOUNTER — APPOINTMENT (OUTPATIENT)
Dept: MRI IMAGING | Facility: CLINIC | Age: 73
End: 2023-09-09
Payer: MEDICARE

## 2023-09-09 DIAGNOSIS — Z98.89 OTHER SPECIFIED POSTPROCEDURAL STATES: Chronic | ICD-10-CM

## 2023-09-09 DIAGNOSIS — Z41.1 ENCOUNTER FOR COSMETIC SURGERY: Chronic | ICD-10-CM

## 2023-09-09 PROCEDURE — 72148 MRI LUMBAR SPINE W/O DYE: CPT | Mod: 26,MH

## 2023-09-12 ENCOUNTER — APPOINTMENT (OUTPATIENT)
Dept: SPINE | Facility: CLINIC | Age: 73
End: 2023-09-12

## 2023-09-19 ENCOUNTER — NON-APPOINTMENT (OUTPATIENT)
Age: 73
End: 2023-09-19

## 2023-09-20 ENCOUNTER — APPOINTMENT (OUTPATIENT)
Dept: NEUROLOGY | Facility: CLINIC | Age: 73
End: 2023-09-20
Payer: MEDICARE

## 2023-09-20 DIAGNOSIS — M79.604 PAIN IN RIGHT LEG: ICD-10-CM

## 2023-09-20 DIAGNOSIS — M79.605 PAIN IN RIGHT LEG: ICD-10-CM

## 2023-09-20 PROCEDURE — 99213 OFFICE O/P EST LOW 20 MIN: CPT | Mod: 95

## 2023-09-20 RX ORDER — PANTOPRAZOLE 40 MG/1
40 TABLET, DELAYED RELEASE ORAL
Qty: 30 | Refills: 0 | Status: DISCONTINUED | COMMUNITY
Start: 2022-03-14 | End: 2023-09-20

## 2023-09-22 ENCOUNTER — TRANSCRIPTION ENCOUNTER (OUTPATIENT)
Age: 73
End: 2023-09-22

## 2023-09-28 ENCOUNTER — APPOINTMENT (OUTPATIENT)
Dept: NEUROLOGY | Facility: CLINIC | Age: 73
End: 2023-09-28
Payer: MEDICARE

## 2023-09-28 VITALS
BODY MASS INDEX: 23.74 KG/M2 | WEIGHT: 129 LBS | DIASTOLIC BLOOD PRESSURE: 77 MMHG | SYSTOLIC BLOOD PRESSURE: 129 MMHG | HEART RATE: 75 BPM | HEIGHT: 62 IN | TEMPERATURE: 97.3 F | OXYGEN SATURATION: 96 %

## 2023-09-28 PROCEDURE — 99213 OFFICE O/P EST LOW 20 MIN: CPT

## 2023-11-16 ENCOUNTER — APPOINTMENT (OUTPATIENT)
Dept: OTOLARYNGOLOGY | Facility: CLINIC | Age: 73
End: 2023-11-16
Payer: MEDICARE

## 2023-11-16 VITALS — WEIGHT: 129 LBS | BODY MASS INDEX: 23.74 KG/M2 | HEIGHT: 62 IN

## 2023-11-16 DIAGNOSIS — H92.01 OTALGIA, RIGHT EAR: ICD-10-CM

## 2023-11-16 DIAGNOSIS — H61.23 IMPACTED CERUMEN, BILATERAL: ICD-10-CM

## 2023-11-16 DIAGNOSIS — K21.9 GASTRO-ESOPHAGEAL REFLUX DISEASE W/OUT ESOPHAGITIS: ICD-10-CM

## 2023-11-16 PROCEDURE — 69210 REMOVE IMPACTED EAR WAX UNI: CPT

## 2023-11-16 PROCEDURE — 99214 OFFICE O/P EST MOD 30 MIN: CPT | Mod: 25

## 2023-11-16 PROCEDURE — 31575 DIAGNOSTIC LARYNGOSCOPY: CPT

## 2023-11-16 RX ORDER — AZELASTINE HYDROCHLORIDE 137 UG/1
0.1 SPRAY, METERED NASAL TWICE DAILY
Qty: 3 | Refills: 3 | Status: ACTIVE | COMMUNITY
Start: 2023-11-16 | End: 1900-01-01

## 2023-11-16 RX ORDER — FLUTICASONE PROPIONATE 50 UG/1
50 SPRAY, METERED NASAL DAILY
Qty: 3 | Refills: 2 | Status: ACTIVE | COMMUNITY
Start: 2023-11-16 | End: 1900-01-01

## 2023-12-12 NOTE — ED ADULT NURSE NOTE - CHIEF COMPLAINT QUOTE
patient comes in for abdominal pain which she feels like its due to food poisoning Has a history of diverticulosis and states has a lot of popcorn yesterday. no

## 2024-05-29 NOTE — ED ADULT NURSE NOTE - TEMPLATE LIST FOR HEAD TO TOE ASSESSMENT
Detail Level: Simple
Add 44609 Cpt? (Important Note: In 2017 The Use Of 69743 Is Being Tracked By Cms To Determine Future Global Period Reimbursement For Global Periods): yes
Abdominal Pain, N/V/D

## 2024-06-05 ENCOUNTER — APPOINTMENT (RX ONLY)
Dept: URBAN - METROPOLITAN AREA CLINIC 86 | Facility: CLINIC | Age: 74
Setting detail: DERMATOLOGY
End: 2024-06-05

## 2024-06-05 VITALS — WEIGHT: 128 LBS | HEIGHT: 62 IN

## 2024-06-05 DIAGNOSIS — L73.8 OTHER SPECIFIED FOLLICULAR DISORDERS: ICD-10-CM

## 2024-06-05 DIAGNOSIS — D17 BENIGN LIPOMATOUS NEOPLASM: ICD-10-CM

## 2024-06-05 DIAGNOSIS — L71.8 OTHER ROSACEA: ICD-10-CM

## 2024-06-05 DIAGNOSIS — D22 MELANOCYTIC NEVI: ICD-10-CM

## 2024-06-05 DIAGNOSIS — L0390 CELLULITIS AND ABSCESS OF UNSPECIFIED SITES: ICD-10-CM

## 2024-06-05 DIAGNOSIS — L82.0 INFLAMED SEBORRHEIC KERATOSIS: ICD-10-CM

## 2024-06-05 DIAGNOSIS — L0391 CELLULITIS AND ABSCESS OF UNSPECIFIED SITES: ICD-10-CM

## 2024-06-05 PROBLEM — D17.21 BENIGN LIPOMATOUS NEOPLASM OF SKIN AND SUBCUTANEOUS TISSUE OF RIGHT ARM: Status: ACTIVE | Noted: 2024-06-05

## 2024-06-05 PROBLEM — L02.212 CUTANEOUS ABSCESS OF BACK [ANY PART, EXCEPT BUTTOCK]: Status: ACTIVE | Noted: 2024-06-05

## 2024-06-05 PROBLEM — D22.5 MELANOCYTIC NEVI OF TRUNK: Status: ACTIVE | Noted: 2024-06-05

## 2024-06-05 PROBLEM — D23.72 OTHER BENIGN NEOPLASM OF SKIN OF LEFT LOWER LIMB, INCLUDING HIP: Status: ACTIVE | Noted: 2024-06-05

## 2024-06-05 PROCEDURE — ? SHAVE REMOVAL (NO PATHOLOGY)

## 2024-06-05 PROCEDURE — ? COUNSELING

## 2024-06-05 PROCEDURE — 99203 OFFICE O/P NEW LOW 30 MIN: CPT | Mod: 25

## 2024-06-05 PROCEDURE — 10060 I&D ABSCESS SIMPLE/SINGLE: CPT

## 2024-06-05 PROCEDURE — ? INCISION AND DRAINAGE

## 2024-06-05 PROCEDURE — 11306 SHAVE SKIN LESION 0.6-1.0 CM: CPT

## 2024-06-05 PROCEDURE — ? PRESCRIPTION

## 2024-06-05 RX ORDER — METRONIDAZOLE 7.5 MG/G
CREAM TOPICAL QHS
Qty: 45 | Refills: 2 | Status: ERX | COMMUNITY
Start: 2024-06-05

## 2024-06-05 RX ORDER — DOXYCYCLINE HYCLATE 100 MG/1
CAPSULE, GELATIN COATED ORAL
Qty: 10 | Refills: 0 | Status: ERX | COMMUNITY
Start: 2024-06-05

## 2024-06-05 RX ADMIN — DOXYCYCLINE HYCLATE: 100 CAPSULE, GELATIN COATED ORAL at 00:00

## 2024-06-05 RX ADMIN — METRONIDAZOLE: 7.5 CREAM TOPICAL at 00:00

## 2024-06-05 ASSESSMENT — LOCATION ZONE DERM
LOCATION ZONE: FACE
LOCATION ZONE: NECK
LOCATION ZONE: ARM
LOCATION ZONE: TRUNK
LOCATION ZONE: NOSE

## 2024-06-05 ASSESSMENT — LOCATION SIMPLE DESCRIPTION DERM
LOCATION SIMPLE: UPPER BACK
LOCATION SIMPLE: LEFT CHEEK
LOCATION SIMPLE: RIGHT ANTERIOR NECK
LOCATION SIMPLE: LEFT UPPER BACK
LOCATION SIMPLE: RIGHT FOREARM
LOCATION SIMPLE: NOSE
LOCATION SIMPLE: RIGHT CHEEK

## 2024-06-05 ASSESSMENT — LOCATION DETAILED DESCRIPTION DERM
LOCATION DETAILED: LEFT SUPERIOR UPPER BACK
LOCATION DETAILED: RIGHT DISTAL DORSAL FOREARM
LOCATION DETAILED: RIGHT INFERIOR LATERAL NECK
LOCATION DETAILED: NASAL DORSUM
LOCATION DETAILED: LEFT MEDIAL MALAR CHEEK
LOCATION DETAILED: INFERIOR THORACIC SPINE
LOCATION DETAILED: RIGHT MEDIAL MALAR CHEEK

## 2024-06-05 NOTE — PROCEDURE: INCISION AND DRAINAGE
Detail Level: Detailed
Lesion Type: Abscess
Curette: No
Anesthesia Type: 1% lidocaine with epinephrine
Anesthesia Volume In Cc: 1.5
Size Of Lesion In Cm (Optional But May Be Required For Some Insurances): 0
Wound Care: Petrolatum
Dressing: dry sterile dressing
Epidermal Sutures: 4-0 Ethilon
Epidermal Closure: simple interrupted
Suture Text: The incision was partially closed with
Preparation Text: The area was prepped in the usual clean fashion.
Curette Text (Optional): After the contents were expressed a curette was used to partially remove the cyst wall.
Consent was obtained and risks were reviewed including but not limited to delayed wound healing, infection, need for multiple I and D's, and pain.
Post-Care Instructions: I reviewed with the patient in detail post-care instructions. Patient should keep wound covered and call the office should any redness, pain, swelling or worsening occur.

## 2024-06-05 NOTE — PROCEDURE: SHAVE REMOVAL (NO PATHOLOGY)
Medical Necessity Information: It is in your best interest to select a reason for this procedure from the list below. All of these items fulfill various CMS LCD requirements except the new and changing color options.
Include Z78.9 (Other Specified Conditions Influencing Health Status) As An Associated Diagnosis?: No
Medical Necessity Clause: This procedure was medically necessary because the lesion that was treated was:
Detail Level: Detailed
Size Of Lesion In Cm: 0.7
X Size Of Lesion In Cm (Optional): 0
Anesthesia Type: 1% lidocaine with epinephrine
Anesthesia Volume In Cc: 0.5
Hemostasis: Electrodesiccation
Wound Care: Aquaphor
Consent was obtained from the patient. The risks and benefits to therapy were discussed in detail. Specifically, the risks of infection, scarring, bleeding, prolonged wound healing, incomplete removal, allergy to anesthesia, nerve injury and recurrence were addressed. Prior to the procedure, the treatment site was clearly identified and confirmed by the patient. All components of Universal Protocol/PAUSE Rule completed.
Post-Care Instructions: I reviewed with the patient in detail post-care instructions. Patient is to keep the biopsy site dry overnight, and then apply bacitracin twice daily until healed. Patient may apply hydrogen peroxide soaks to remove any crusting.

## 2024-08-07 ENCOUNTER — APPOINTMENT (RX ONLY)
Dept: URBAN - METROPOLITAN AREA CLINIC 86 | Facility: CLINIC | Age: 74
Setting detail: DERMATOLOGY
End: 2024-08-07

## 2024-08-07 VITALS — HEIGHT: 62 IN | WEIGHT: 122 LBS

## 2024-08-07 DIAGNOSIS — T07XXXA INSECT BITE, NONVENOMOUS, OF OTHER, MULTIPLE, AND UNSPECIFIED SITES, WITHOUT MENTION OF INFECTION: ICD-10-CM

## 2024-08-07 DIAGNOSIS — L21.8 OTHER SEBORRHEIC DERMATITIS: ICD-10-CM

## 2024-08-07 DIAGNOSIS — L0391 CELLULITIS AND ABSCESS OF UNSPECIFIED SITES: ICD-10-CM | Status: RESOLVED

## 2024-08-07 DIAGNOSIS — L0390 CELLULITIS AND ABSCESS OF UNSPECIFIED SITES: ICD-10-CM | Status: RESOLVED

## 2024-08-07 PROBLEM — S80.862A INSECT BITE (NONVENOMOUS), LEFT LOWER LEG, INITIAL ENCOUNTER: Status: ACTIVE | Noted: 2024-08-07

## 2024-08-07 PROBLEM — S40.861A INSECT BITE (NONVENOMOUS) OF RIGHT UPPER ARM, INITIAL ENCOUNTER: Status: ACTIVE | Noted: 2024-08-07

## 2024-08-07 PROBLEM — L02.212 CUTANEOUS ABSCESS OF BACK [ANY PART, EXCEPT BUTTOCK]: Status: ACTIVE | Noted: 2024-08-07

## 2024-08-07 PROBLEM — S50.861A INSECT BITE (NONVENOMOUS) OF RIGHT FOREARM, INITIAL ENCOUNTER: Status: ACTIVE | Noted: 2024-08-07

## 2024-08-07 PROCEDURE — ? TREATMENT REGIMEN

## 2024-08-07 PROCEDURE — ? PRESCRIPTION

## 2024-08-07 PROCEDURE — ? COUNSELING

## 2024-08-07 PROCEDURE — 99213 OFFICE O/P EST LOW 20 MIN: CPT

## 2024-08-07 PROCEDURE — ? PRESCRIPTION MEDICATION MANAGEMENT

## 2024-08-07 RX ORDER — FLUOCINONIDE 0.5 MG/G
CREAM TOPICAL BID
Qty: 60 | Refills: 0 | Status: ERX | COMMUNITY
Start: 2024-08-07

## 2024-08-07 RX ORDER — HYDROCORTISONE 25 MG/G
CREAM TOPICAL BID
Qty: 30 | Refills: 2 | Status: ERX | COMMUNITY
Start: 2024-08-07

## 2024-08-07 RX ADMIN — FLUOCINONIDE: 0.5 CREAM TOPICAL at 00:00

## 2024-08-07 RX ADMIN — HYDROCORTISONE: 25 CREAM TOPICAL at 00:00

## 2024-08-07 ASSESSMENT — LOCATION SIMPLE DESCRIPTION DERM
LOCATION SIMPLE: RIGHT POSTERIOR UPPER ARM
LOCATION SIMPLE: UPPER BACK
LOCATION SIMPLE: RIGHT FOREARM
LOCATION SIMPLE: CHIN
LOCATION SIMPLE: RIGHT CHEEK
LOCATION SIMPLE: LEFT ACHILLES SKIN
LOCATION SIMPLE: LEFT CHEEK

## 2024-08-07 ASSESSMENT — LOCATION DETAILED DESCRIPTION DERM
LOCATION DETAILED: LEFT ACHILLES SKIN
LOCATION DETAILED: RIGHT PROXIMAL DORSAL FOREARM
LOCATION DETAILED: RIGHT DISTAL LATERAL POSTERIOR UPPER ARM
LOCATION DETAILED: RIGHT VENTRAL LATERAL PROXIMAL FOREARM
LOCATION DETAILED: RIGHT DISTAL RADIAL DORSAL FOREARM
LOCATION DETAILED: LEFT SUPERIOR LATERAL BUCCAL CHEEK
LOCATION DETAILED: RIGHT SUPERIOR MEDIAL BUCCAL CHEEK
LOCATION DETAILED: LEFT CHIN
LOCATION DETAILED: INFERIOR THORACIC SPINE

## 2024-08-07 ASSESSMENT — LOCATION ZONE DERM
LOCATION ZONE: FACE
LOCATION ZONE: TRUNK
LOCATION ZONE: LEG
LOCATION ZONE: ARM

## 2024-08-07 NOTE — PROCEDURE: PRESCRIPTION MEDICATION MANAGEMENT
Detail Level: Zone
Initiate Treatment: Fluocinonide .05% topical cream apply to affected areas ( upper and lower extremities ) BID
Render In Strict Bullet Format?: No
Initiate Treatment: Hydrocortisone 2.5% topical cream apply to affected areas ( face ) BID

## 2024-12-11 ENCOUNTER — APPOINTMENT (OUTPATIENT)
Dept: URBAN - METROPOLITAN AREA CLINIC 86 | Facility: CLINIC | Age: 74
Setting detail: DERMATOLOGY
End: 2024-12-11

## 2024-12-11 VITALS — HEIGHT: 62 IN | WEIGHT: 120 LBS

## 2024-12-11 DIAGNOSIS — L64.8 OTHER ANDROGENIC ALOPECIA: ICD-10-CM

## 2024-12-11 DIAGNOSIS — B00.1 HERPESVIRAL VESICULAR DERMATITIS: ICD-10-CM

## 2024-12-11 DIAGNOSIS — L82.1 OTHER SEBORRHEIC KERATOSIS: ICD-10-CM

## 2024-12-11 DIAGNOSIS — D22 MELANOCYTIC NEVI: ICD-10-CM

## 2024-12-11 DIAGNOSIS — R20.2 PARESTHESIA OF SKIN: ICD-10-CM

## 2024-12-11 DIAGNOSIS — Z71.89 OTHER SPECIFIED COUNSELING: ICD-10-CM

## 2024-12-11 DIAGNOSIS — L71.8 OTHER ROSACEA: ICD-10-CM

## 2024-12-11 DIAGNOSIS — L72.8 OTHER FOLLICULAR CYSTS OF THE SKIN AND SUBCUTANEOUS TISSUE: ICD-10-CM

## 2024-12-11 PROBLEM — D22.5 MELANOCYTIC NEVI OF TRUNK: Status: ACTIVE | Noted: 2024-12-11

## 2024-12-11 PROBLEM — D22.61 MELANOCYTIC NEVI OF RIGHT UPPER LIMB, INCLUDING SHOULDER: Status: ACTIVE | Noted: 2024-12-11

## 2024-12-11 PROCEDURE — 99214 OFFICE O/P EST MOD 30 MIN: CPT

## 2024-12-11 PROCEDURE — ? COUNSELING

## 2024-12-11 PROCEDURE — ? PRESCRIPTION MEDICATION MANAGEMENT

## 2024-12-11 ASSESSMENT — LOCATION DETAILED DESCRIPTION DERM
LOCATION DETAILED: RIGHT SUPRAPUBIC SKIN
LOCATION DETAILED: RIGHT SUPERIOR MEDIAL UPPER BACK
LOCATION DETAILED: EPIGASTRIC SKIN
LOCATION DETAILED: RIGHT SUPERIOR MEDIAL FOREHEAD
LOCATION DETAILED: RIGHT BUTTOCK
LOCATION DETAILED: LEFT SUPERIOR PARIETAL SCALP
LOCATION DETAILED: RIGHT VENTRAL PROXIMAL FOREARM
LOCATION DETAILED: RIGHT MEDIAL MALAR CHEEK
LOCATION DETAILED: INFERIOR THORACIC SPINE

## 2024-12-11 ASSESSMENT — LOCATION SIMPLE DESCRIPTION DERM
LOCATION SIMPLE: SCALP
LOCATION SIMPLE: RIGHT UPPER BACK
LOCATION SIMPLE: RIGHT BUTTOCK
LOCATION SIMPLE: RIGHT CHEEK
LOCATION SIMPLE: ABDOMEN
LOCATION SIMPLE: RIGHT FOREARM
LOCATION SIMPLE: GROIN
LOCATION SIMPLE: UPPER BACK
LOCATION SIMPLE: RIGHT FOREHEAD

## 2024-12-11 ASSESSMENT — LOCATION ZONE DERM
LOCATION ZONE: ARM
LOCATION ZONE: FACE
LOCATION ZONE: SCALP
LOCATION ZONE: TRUNK

## 2024-12-11 NOTE — PROCEDURE: PRESCRIPTION MEDICATION MANAGEMENT
Render In Strict Bullet Format?: Yes
Detail Level: Detailed
Initiate Treatment: Hydrocortisone 2.5% cream
Continue Regimen: Acyclovir ointment
Continue Regimen: Minoxidil topical
Continue Regimen: Noritate

## 2024-12-16 ENCOUNTER — RX ONLY (RX ONLY)
Age: 74
End: 2024-12-16

## 2024-12-16 RX ORDER — TRETIONIN 1 MG/G
1 CREAM TOPICAL QHS
Qty: 135 | Refills: 1 | Status: ERX | COMMUNITY
Start: 2024-12-16